# Patient Record
Sex: MALE | Race: WHITE
[De-identification: names, ages, dates, MRNs, and addresses within clinical notes are randomized per-mention and may not be internally consistent; named-entity substitution may affect disease eponyms.]

---

## 2017-12-20 ENCOUNTER — HOSPITAL ENCOUNTER (OUTPATIENT)
Dept: HOSPITAL 94 - RAD | Age: 57
Discharge: HOME | End: 2017-12-20
Attending: FAMILY MEDICINE
Payer: COMMERCIAL

## 2017-12-20 DIAGNOSIS — I12.9: Primary | ICD-10-CM

## 2017-12-20 DIAGNOSIS — N20.0: ICD-10-CM

## 2017-12-20 DIAGNOSIS — N18.1: ICD-10-CM

## 2017-12-20 DIAGNOSIS — I10: ICD-10-CM

## 2017-12-20 PROCEDURE — 76775 US EXAM ABDO BACK WALL LIM: CPT

## 2018-02-28 ENCOUNTER — HOSPITAL ENCOUNTER (OUTPATIENT)
Dept: HOSPITAL 94 - LAB | Age: 58
Discharge: HOME | End: 2018-02-28
Attending: FAMILY MEDICINE
Payer: COMMERCIAL

## 2018-02-28 DIAGNOSIS — E55.9: ICD-10-CM

## 2018-02-28 DIAGNOSIS — N18.1: ICD-10-CM

## 2018-02-28 DIAGNOSIS — I12.9: ICD-10-CM

## 2018-02-28 DIAGNOSIS — N20.0: ICD-10-CM

## 2018-02-28 DIAGNOSIS — Z91.89: ICD-10-CM

## 2018-02-28 DIAGNOSIS — E78.5: Primary | ICD-10-CM

## 2018-02-28 DIAGNOSIS — R73.02: ICD-10-CM

## 2018-02-28 LAB
ALBUMIN SERPL BCP-MCNC: 4 G/DL (ref 3.4–5)
ALBUMIN/GLOB SERPL: 1.3 {RATIO} (ref 1.1–1.5)
ALP SERPL-CCNC: 79 IU/L (ref 46–116)
ALT SERPL W P-5'-P-CCNC: 50 U/L (ref 12–78)
ANION GAP SERPL CALCULATED.3IONS-SCNC: 8 MMOL/L (ref 8–16)
AST SERPL W P-5'-P-CCNC: 27 U/L (ref 10–37)
BILIRUB SERPL-MCNC: 0.6 MG/DL (ref 0.1–1)
BUN SERPL-MCNC: 22 MG/DL (ref 7–18)
BUN/CREAT SERPL: 17.3 (ref 5.4–32)
CALCIUM SERPL-MCNC: 8.8 MG/DL (ref 8.5–10.1)
CHLORIDE SERPL-SCNC: 106 MMOL/L (ref 99–107)
CHOLEST SERPL-MCNC: 203 MG/DL (ref 0–200)
CHOLEST/HDLC SERPL: 4.6 {RATIO} (ref 0–4.99)
CO2 SERPL-SCNC: 28.4 MMOL/L (ref 24–32)
CREAT SERPL-MCNC: 1.27 MG/DL (ref 0.6–1.1)
GFR SERPL CREATININE-BSD FRML MDRD: 58 ML/MIN
GLUCOSE SERPL-MCNC: 97 MG/DL (ref 70–104)
HBA1C MFR BLD: 5.1 % (ref 4.5–6.2)
HDLC SERPL-MCNC: 44 MG/DL (ref 35–60)
LDLC SERPL DIRECT ASSAY-MCNC: 133 MG/DL (ref 50–100)
PH UR STRIP: 5 [PH] (ref 4.8–8)
POTASSIUM SERPL-SCNC: 4.4 MMOL/L (ref 3.5–5.1)
PROT SERPL-MCNC: 7.2 G/DL (ref 6.4–8.2)
SODIUM SERPL-SCNC: 142 MMOL/L (ref 135–145)
SP GR UR STRIP: 1.02 (ref 1–1.03)
TRIGL SERPL-MCNC: 119 MG/DL (ref 20–135)
URN COLLECT METHOD CLASS: (no result)
UROBILINOGEN UR STRIP-MCNC: 0.2 E.U/DL (ref 0.2–1)

## 2018-02-28 PROCEDURE — 83036 HEMOGLOBIN GLYCOSYLATED A1C: CPT

## 2018-02-28 PROCEDURE — 84403 ASSAY OF TOTAL TESTOSTERONE: CPT

## 2018-02-28 PROCEDURE — 84402 ASSAY OF FREE TESTOSTERONE: CPT

## 2018-02-28 PROCEDURE — 82306 VITAMIN D 25 HYDROXY: CPT

## 2018-02-28 PROCEDURE — 80061 LIPID PANEL: CPT

## 2018-02-28 PROCEDURE — 83970 ASSAY OF PARATHORMONE: CPT

## 2018-02-28 PROCEDURE — 36415 COLL VENOUS BLD VENIPUNCTURE: CPT

## 2018-02-28 PROCEDURE — 82330 ASSAY OF CALCIUM: CPT

## 2018-02-28 PROCEDURE — 81003 URINALYSIS AUTO W/O SCOPE: CPT

## 2018-02-28 PROCEDURE — 80053 COMPREHEN METABOLIC PANEL: CPT

## 2018-03-01 LAB
25(OH)D3 SERPL-MCNC: 21.8 NG/ML (ref 30–100)
PTH-INTACT SERPL-MCNC: 96.2 PG/ML (ref 11–67)

## 2018-04-11 ENCOUNTER — HOSPITAL ENCOUNTER (INPATIENT)
Dept: HOSPITAL 94 - PAS IN | Age: 58
LOS: 5 days | Discharge: HOME | DRG: 470 | End: 2018-04-16
Attending: ORTHOPAEDIC SURGERY | Admitting: ORTHOPAEDIC SURGERY
Payer: COMMERCIAL

## 2018-04-11 VITALS — HEIGHT: 72 IN | BODY MASS INDEX: 31.2 KG/M2 | WEIGHT: 230.38 LBS

## 2018-04-11 DIAGNOSIS — Z79.82: ICD-10-CM

## 2018-04-11 DIAGNOSIS — M16.12: Primary | ICD-10-CM

## 2018-04-11 DIAGNOSIS — D62: ICD-10-CM

## 2018-04-11 LAB
ALBUMIN SERPL BCP-MCNC: 4.2 G/DL (ref 3.4–5)
ALBUMIN/GLOB SERPL: 1.1 {RATIO} (ref 1.1–1.5)
ALP SERPL-CCNC: 100 IU/L (ref 46–116)
ALT SERPL W P-5'-P-CCNC: 48 U/L (ref 30–65)
ANION GAP SERPL CALCULATED.3IONS-SCNC: 9 MMOL/L (ref 8–16)
AST SERPL W P-5'-P-CCNC: 19 U/L (ref 10–37)
BACTERIA URNS QL MICRO: (no result) /HPF
BASOPHILS # BLD AUTO: 0 X10'3 (ref 0–0.2)
BASOPHILS NFR BLD AUTO: 0.7 % (ref 0–1)
BILIRUB SERPL-MCNC: 0.5 MG/DL (ref 0–1)
BUN SERPL-MCNC: 30 MG/DL (ref 7–18)
BUN/CREAT SERPL: 19.4 (ref 5.4–32)
CALCIUM SERPL-MCNC: 9.3 MG/DL (ref 8.5–10.1)
CHLORIDE SERPL-SCNC: 105 MMOL/L (ref 99–107)
CLARITY UR: CLEAR
CO2 SERPL-SCNC: 28.6 MMOL/L (ref 24–32)
COLOR UR: YELLOW
CREAT SERPL-MCNC: 1.55 MG/DL (ref 0.6–1.1)
DEPRECATED SQUAMOUS URNS QL MICRO: (no result) /LPF
EOSINOPHIL # BLD AUTO: 0.2 X10'3 (ref 0–0.9)
EOSINOPHIL NFR BLD AUTO: 3.5 % (ref 0–6)
ERYTHROCYTE [DISTWIDTH] IN BLOOD BY AUTOMATED COUNT: 13.5 % (ref 11.5–14.5)
GFR SERPL CREATININE-BSD FRML MDRD: 46 ML/MIN
GLUCOSE SERPL-MCNC: 83 MG/DL (ref 70–104)
GLUCOSE UR STRIP-MCNC: NEGATIVE MG/DL
HCT VFR BLD AUTO: 47 % (ref 42–52)
HGB BLD-MCNC: 15.8 G/DL (ref 14–17.9)
HGB UR QL STRIP: (no result)
HYALINE CASTS URNS QL MICRO: (no result) /LPF
KETONES UR STRIP-MCNC: NEGATIVE MG/DL
LEUKOCYTE ESTERASE UR QL STRIP: NEGATIVE
LYMPHOCYTES # BLD AUTO: 1.2 X10'3 (ref 1.1–4.8)
LYMPHOCYTES NFR BLD AUTO: 17.5 % (ref 21–51)
MCH RBC QN AUTO: 28.3 PG (ref 27–31)
MCHC RBC AUTO-ENTMCNC: 33.5 % (ref 33–36.5)
MCV RBC AUTO: 84.4 FL (ref 78–98)
MONOCYTES # BLD AUTO: 0.7 X10'3 (ref 0–0.9)
MONOCYTES NFR BLD AUTO: 9.9 % (ref 2–12)
MUCOUS THREADS URNS QL MICRO: (no result) /LPF
NEUTROPHILS # BLD AUTO: 5 X10'3 (ref 1.8–7.7)
NEUTROPHILS NFR BLD AUTO: 68.4 % (ref 42–75)
NITRITE UR QL STRIP: NEGATIVE
PH UR STRIP: 5 [PH] (ref 4.8–8)
PLATELET # BLD AUTO: 224 X10'3 (ref 140–440)
PMV BLD AUTO: 8.3 FL (ref 7.4–10.4)
POTASSIUM SERPL-SCNC: 4.2 MMOL/L (ref 3.4–5.1)
PROT SERPL-MCNC: 8 G/DL (ref 6.4–8.2)
PROT UR QL STRIP: NEGATIVE MG/DL
RBC # BLD AUTO: 5.57 X10'6 (ref 4.7–6.1)
RBC #/AREA URNS HPF: (no result) /HPF (ref 0–2)
SODIUM SERPL-SCNC: 143 MMOL/L (ref 135–145)
SP GR UR STRIP: 1.02 (ref 1–1.03)
URN COLLECT METHOD CLASS: (no result)
UROBILINOGEN UR STRIP-MCNC: 0.2 E.U/DL (ref 0.2–1)
WBC #/AREA URNS HPF: (no result) /HPF (ref 0–4)

## 2018-04-11 PROCEDURE — 87070 CULTURE OTHR SPECIMN AEROBIC: CPT

## 2018-04-11 PROCEDURE — 97162 PT EVAL MOD COMPLEX 30 MIN: CPT

## 2018-04-11 PROCEDURE — 81001 URINALYSIS AUTO W/SCOPE: CPT

## 2018-04-11 PROCEDURE — 72170 X-RAY EXAM OF PELVIS: CPT

## 2018-04-11 PROCEDURE — 86901 BLOOD TYPING SEROLOGIC RH(D): CPT

## 2018-04-11 PROCEDURE — 86885 COOMBS TEST INDIRECT QUAL: CPT

## 2018-04-11 PROCEDURE — 93005 ELECTROCARDIOGRAM TRACING: CPT

## 2018-04-11 PROCEDURE — 80053 COMPREHEN METABOLIC PANEL: CPT

## 2018-04-11 PROCEDURE — 71046 X-RAY EXAM CHEST 2 VIEWS: CPT

## 2018-04-11 PROCEDURE — 36415 COLL VENOUS BLD VENIPUNCTURE: CPT

## 2018-04-11 PROCEDURE — 97116 GAIT TRAINING THERAPY: CPT

## 2018-04-11 PROCEDURE — 85025 COMPLETE CBC W/AUTO DIFF WBC: CPT

## 2018-04-11 PROCEDURE — 97110 THERAPEUTIC EXERCISES: CPT

## 2018-04-11 PROCEDURE — 80051 ELECTROLYTE PANEL: CPT

## 2018-04-11 PROCEDURE — 97530 THERAPEUTIC ACTIVITIES: CPT

## 2018-04-11 PROCEDURE — 86900 BLOOD TYPING SEROLOGIC ABO: CPT

## 2018-04-12 VITALS — SYSTOLIC BLOOD PRESSURE: 105 MMHG | DIASTOLIC BLOOD PRESSURE: 70 MMHG

## 2018-04-12 VITALS — DIASTOLIC BLOOD PRESSURE: 80 MMHG | SYSTOLIC BLOOD PRESSURE: 116 MMHG

## 2018-04-12 VITALS — DIASTOLIC BLOOD PRESSURE: 93 MMHG | SYSTOLIC BLOOD PRESSURE: 139 MMHG

## 2018-04-12 VITALS — SYSTOLIC BLOOD PRESSURE: 91 MMHG | DIASTOLIC BLOOD PRESSURE: 57 MMHG

## 2018-04-12 VITALS — SYSTOLIC BLOOD PRESSURE: 110 MMHG | DIASTOLIC BLOOD PRESSURE: 72 MMHG

## 2018-04-12 VITALS — SYSTOLIC BLOOD PRESSURE: 102 MMHG | DIASTOLIC BLOOD PRESSURE: 63 MMHG

## 2018-04-12 VITALS — SYSTOLIC BLOOD PRESSURE: 90 MMHG | DIASTOLIC BLOOD PRESSURE: 55 MMHG

## 2018-04-12 VITALS — DIASTOLIC BLOOD PRESSURE: 69 MMHG | SYSTOLIC BLOOD PRESSURE: 105 MMHG

## 2018-04-12 VITALS — DIASTOLIC BLOOD PRESSURE: 53 MMHG | SYSTOLIC BLOOD PRESSURE: 88 MMHG

## 2018-04-12 VITALS — DIASTOLIC BLOOD PRESSURE: 84 MMHG | SYSTOLIC BLOOD PRESSURE: 137 MMHG

## 2018-04-12 VITALS — DIASTOLIC BLOOD PRESSURE: 72 MMHG | SYSTOLIC BLOOD PRESSURE: 145 MMHG

## 2018-04-12 VITALS — SYSTOLIC BLOOD PRESSURE: 117 MMHG | DIASTOLIC BLOOD PRESSURE: 73 MMHG

## 2018-04-12 VITALS — SYSTOLIC BLOOD PRESSURE: 86 MMHG | DIASTOLIC BLOOD PRESSURE: 49 MMHG

## 2018-04-12 VITALS — DIASTOLIC BLOOD PRESSURE: 53 MMHG | SYSTOLIC BLOOD PRESSURE: 93 MMHG

## 2018-04-12 VITALS — DIASTOLIC BLOOD PRESSURE: 46 MMHG | SYSTOLIC BLOOD PRESSURE: 107 MMHG

## 2018-04-12 VITALS — SYSTOLIC BLOOD PRESSURE: 111 MMHG | DIASTOLIC BLOOD PRESSURE: 69 MMHG

## 2018-04-12 VITALS — SYSTOLIC BLOOD PRESSURE: 104 MMHG | DIASTOLIC BLOOD PRESSURE: 60 MMHG

## 2018-04-12 VITALS — SYSTOLIC BLOOD PRESSURE: 133 MMHG | DIASTOLIC BLOOD PRESSURE: 81 MMHG

## 2018-04-12 VITALS — SYSTOLIC BLOOD PRESSURE: 143 MMHG | DIASTOLIC BLOOD PRESSURE: 76 MMHG

## 2018-04-12 VITALS — SYSTOLIC BLOOD PRESSURE: 117 MMHG | DIASTOLIC BLOOD PRESSURE: 68 MMHG

## 2018-04-12 PROCEDURE — 0SRB0JZ REPLACEMENT OF LEFT HIP JOINT WITH SYNTHETIC SUBSTITUTE, OPEN APPROACH: ICD-10-PCS | Performed by: ORTHOPAEDIC SURGERY

## 2018-04-12 RX ADMIN — CEFAZOLIN SODIUM SCH MLS/HR: 2 SOLUTION INTRAVENOUS at 23:29

## 2018-04-12 RX ADMIN — SODIUM CHLORIDE AND POTASSIUM CHLORIDE SCH MLS/HR: 4.5; 1.49 INJECTION, SOLUTION INTRAVENOUS at 15:13

## 2018-04-12 RX ADMIN — SODIUM CHLORIDE AND POTASSIUM CHLORIDE SCH MLS/HR: 4.5; 1.49 INJECTION, SOLUTION INTRAVENOUS at 23:13

## 2018-04-12 RX ADMIN — SODIUM CHLORIDE AND POTASSIUM CHLORIDE SCH MLS/HR: 4.5; 1.49 INJECTION, SOLUTION INTRAVENOUS at 20:25

## 2018-04-13 VITALS — DIASTOLIC BLOOD PRESSURE: 82 MMHG | SYSTOLIC BLOOD PRESSURE: 128 MMHG

## 2018-04-13 VITALS — SYSTOLIC BLOOD PRESSURE: 112 MMHG | DIASTOLIC BLOOD PRESSURE: 80 MMHG

## 2018-04-13 VITALS — DIASTOLIC BLOOD PRESSURE: 79 MMHG | SYSTOLIC BLOOD PRESSURE: 138 MMHG

## 2018-04-13 VITALS — SYSTOLIC BLOOD PRESSURE: 130 MMHG | DIASTOLIC BLOOD PRESSURE: 75 MMHG

## 2018-04-13 VITALS — DIASTOLIC BLOOD PRESSURE: 88 MMHG | SYSTOLIC BLOOD PRESSURE: 153 MMHG

## 2018-04-13 LAB
ANION GAP SERPL CALCULATED.3IONS-SCNC: 7 MMOL/L (ref 8–16)
BASOPHILS # BLD AUTO: 0 X10'3 (ref 0–0.2)
BASOPHILS NFR BLD AUTO: 0.2 % (ref 0–1)
CHLORIDE SERPL-SCNC: 106 MMOL/L (ref 99–107)
CO2 SERPL-SCNC: 27.7 MMOL/L (ref 24–32)
EOSINOPHIL # BLD AUTO: 0.1 X10'3 (ref 0–0.9)
EOSINOPHIL NFR BLD AUTO: 1.3 % (ref 0–6)
ERYTHROCYTE [DISTWIDTH] IN BLOOD BY AUTOMATED COUNT: 13.8 % (ref 11.5–14.5)
HCT VFR BLD AUTO: 37 % (ref 42–52)
HGB BLD-MCNC: 12.9 G/DL (ref 14–17.9)
LYMPHOCYTES # BLD AUTO: 0.5 X10'3 (ref 1.1–4.8)
LYMPHOCYTES NFR BLD AUTO: 6.9 % (ref 21–51)
MCH RBC QN AUTO: 29.2 PG (ref 27–31)
MCHC RBC AUTO-ENTMCNC: 34.7 % (ref 33–36.5)
MCV RBC AUTO: 84.2 FL (ref 78–98)
MONOCYTES # BLD AUTO: 0.6 X10'3 (ref 0–0.9)
MONOCYTES NFR BLD AUTO: 8 % (ref 2–12)
NEUTROPHILS # BLD AUTO: 6.4 X10'3 (ref 1.8–7.7)
NEUTROPHILS NFR BLD AUTO: 83.6 % (ref 42–75)
PLATELET # BLD AUTO: 188 X10'3 (ref 140–440)
PMV BLD AUTO: 7.9 FL (ref 7.4–10.4)
POTASSIUM SERPL-SCNC: 4.3 MMOL/L (ref 3.5–5.1)
RBC # BLD AUTO: 4.4 X10'6 (ref 4.7–6.1)
SODIUM SERPL-SCNC: 141 MMOL/L (ref 135–145)
WBC # BLD AUTO: 7.6 X10'3 (ref 4.5–11)

## 2018-04-13 RX ADMIN — ONDANSETRON PRN MG: 2 INJECTION, SOLUTION INTRAMUSCULAR; INTRAVENOUS at 17:48

## 2018-04-13 RX ADMIN — SODIUM CHLORIDE AND POTASSIUM CHLORIDE SCH MLS/HR: 4.5; 1.49 INJECTION, SOLUTION INTRAVENOUS at 23:13

## 2018-04-13 RX ADMIN — SODIUM CHLORIDE AND POTASSIUM CHLORIDE SCH MLS/HR: 4.5; 1.49 INJECTION, SOLUTION INTRAVENOUS at 15:55

## 2018-04-13 RX ADMIN — ONDANSETRON PRN MG: 2 INJECTION, SOLUTION INTRAMUSCULAR; INTRAVENOUS at 07:20

## 2018-04-13 RX ADMIN — ONDANSETRON PRN MG: 2 INJECTION, SOLUTION INTRAMUSCULAR; INTRAVENOUS at 00:19

## 2018-04-13 RX ADMIN — SODIUM CHLORIDE AND POTASSIUM CHLORIDE SCH MLS/HR: 4.5; 1.49 INJECTION, SOLUTION INTRAVENOUS at 05:42

## 2018-04-13 RX ADMIN — CEFAZOLIN SODIUM SCH MLS/HR: 2 SOLUTION INTRAVENOUS at 08:41

## 2018-04-13 RX ADMIN — THERA TABS SCH EACH: TAB at 08:40

## 2018-04-14 VITALS — SYSTOLIC BLOOD PRESSURE: 139 MMHG | DIASTOLIC BLOOD PRESSURE: 80 MMHG

## 2018-04-14 VITALS — SYSTOLIC BLOOD PRESSURE: 144 MMHG | DIASTOLIC BLOOD PRESSURE: 92 MMHG

## 2018-04-14 VITALS — DIASTOLIC BLOOD PRESSURE: 75 MMHG | SYSTOLIC BLOOD PRESSURE: 124 MMHG

## 2018-04-14 VITALS — SYSTOLIC BLOOD PRESSURE: 143 MMHG | DIASTOLIC BLOOD PRESSURE: 89 MMHG

## 2018-04-14 LAB
BASOPHILS # BLD AUTO: 0 X10'3 (ref 0–0.2)
BASOPHILS NFR BLD AUTO: 0.1 % (ref 0–1)
EOSINOPHIL # BLD AUTO: 0.3 X10'3 (ref 0–0.9)
EOSINOPHIL NFR BLD AUTO: 3.5 % (ref 0–6)
ERYTHROCYTE [DISTWIDTH] IN BLOOD BY AUTOMATED COUNT: 13.5 % (ref 11.5–14.5)
HCT VFR BLD AUTO: 38.9 % (ref 42–52)
HGB BLD-MCNC: 13.3 G/DL (ref 14–17.9)
LYMPHOCYTES # BLD AUTO: 0.8 X10'3 (ref 1.1–4.8)
LYMPHOCYTES NFR BLD AUTO: 8.5 % (ref 21–51)
MCH RBC QN AUTO: 29.3 PG (ref 27–31)
MCHC RBC AUTO-ENTMCNC: 34.2 % (ref 33–36.5)
MCV RBC AUTO: 85.7 FL (ref 78–98)
MONOCYTES # BLD AUTO: 1.1 X10'3 (ref 0–0.9)
MONOCYTES NFR BLD AUTO: 11.7 % (ref 2–12)
NEUTROPHILS # BLD AUTO: 7 X10'3 (ref 1.8–7.7)
NEUTROPHILS NFR BLD AUTO: 76.2 % (ref 42–75)
PLATELET # BLD AUTO: 174 X10'3 (ref 140–440)
PMV BLD AUTO: 7.7 FL (ref 7.4–10.4)
RBC # BLD AUTO: 4.54 X10'6 (ref 4.7–6.1)
WBC # BLD AUTO: 9.2 X10'3 (ref 4.5–11)

## 2018-04-14 RX ADMIN — THERA TABS SCH EACH: TAB at 08:34

## 2018-04-15 VITALS — SYSTOLIC BLOOD PRESSURE: 138 MMHG | DIASTOLIC BLOOD PRESSURE: 88 MMHG

## 2018-04-15 VITALS — SYSTOLIC BLOOD PRESSURE: 150 MMHG | DIASTOLIC BLOOD PRESSURE: 85 MMHG

## 2018-04-15 VITALS — DIASTOLIC BLOOD PRESSURE: 86 MMHG | SYSTOLIC BLOOD PRESSURE: 153 MMHG

## 2018-04-15 VITALS — SYSTOLIC BLOOD PRESSURE: 150 MMHG | DIASTOLIC BLOOD PRESSURE: 93 MMHG

## 2018-04-15 LAB
BASOPHILS # BLD AUTO: 0 X10'3 (ref 0–0.2)
BASOPHILS NFR BLD AUTO: 0.2 % (ref 0–1)
EOSINOPHIL # BLD AUTO: 0.3 X10'3 (ref 0–0.9)
EOSINOPHIL NFR BLD AUTO: 4 % (ref 0–6)
ERYTHROCYTE [DISTWIDTH] IN BLOOD BY AUTOMATED COUNT: 13.9 % (ref 11.5–14.5)
HCT VFR BLD AUTO: 37.5 % (ref 42–52)
HGB BLD-MCNC: 13 G/DL (ref 14–17.9)
LYMPHOCYTES # BLD AUTO: 0.7 X10'3 (ref 1.1–4.8)
LYMPHOCYTES NFR BLD AUTO: 9.5 % (ref 21–51)
MCH RBC QN AUTO: 29.4 PG (ref 27–31)
MCHC RBC AUTO-ENTMCNC: 34.6 % (ref 33–36.5)
MCV RBC AUTO: 85.1 FL (ref 78–98)
MONOCYTES # BLD AUTO: 1 X10'3 (ref 0–0.9)
MONOCYTES NFR BLD AUTO: 12.1 % (ref 2–12)
NEUTROPHILS # BLD AUTO: 5.8 X10'3 (ref 1.8–7.7)
NEUTROPHILS NFR BLD AUTO: 74.2 % (ref 42–75)
PLATELET # BLD AUTO: 195 X10'3 (ref 140–440)
PMV BLD AUTO: 7.7 FL (ref 7.4–10.4)
RBC # BLD AUTO: 4.41 X10'6 (ref 4.7–6.1)
WBC # BLD AUTO: 7.9 X10'3 (ref 4.5–11)

## 2018-04-15 RX ADMIN — THERA TABS SCH EACH: TAB at 07:07

## 2018-04-16 VITALS — SYSTOLIC BLOOD PRESSURE: 138 MMHG | DIASTOLIC BLOOD PRESSURE: 90 MMHG

## 2018-04-16 VITALS — DIASTOLIC BLOOD PRESSURE: 92 MMHG | SYSTOLIC BLOOD PRESSURE: 152 MMHG

## 2018-04-16 RX ADMIN — THERA TABS SCH EACH: TAB at 07:54

## 2019-04-18 ENCOUNTER — HOSPITAL ENCOUNTER (EMERGENCY)
Dept: HOSPITAL 94 - ER | Age: 59
Discharge: HOME | End: 2019-04-18
Payer: COMMERCIAL

## 2019-04-18 VITALS — WEIGHT: 224.43 LBS | HEIGHT: 72 IN | BODY MASS INDEX: 30.4 KG/M2

## 2019-04-18 VITALS — DIASTOLIC BLOOD PRESSURE: 87 MMHG | SYSTOLIC BLOOD PRESSURE: 147 MMHG

## 2019-04-18 DIAGNOSIS — Z98.890: ICD-10-CM

## 2019-04-18 DIAGNOSIS — Z90.49: ICD-10-CM

## 2019-04-18 DIAGNOSIS — Z79.2: ICD-10-CM

## 2019-04-18 DIAGNOSIS — I10: ICD-10-CM

## 2019-04-18 DIAGNOSIS — Z79.899: ICD-10-CM

## 2019-04-18 DIAGNOSIS — Z79.82: ICD-10-CM

## 2019-04-18 DIAGNOSIS — J20.9: Primary | ICD-10-CM

## 2019-04-18 PROCEDURE — 99283 EMERGENCY DEPT VISIT LOW MDM: CPT

## 2019-04-18 PROCEDURE — 93005 ELECTROCARDIOGRAM TRACING: CPT

## 2019-04-18 PROCEDURE — 71045 X-RAY EXAM CHEST 1 VIEW: CPT

## 2019-06-18 ENCOUNTER — HOSPITAL ENCOUNTER (EMERGENCY)
Dept: HOSPITAL 94 - ER | Age: 59
Discharge: HOME | End: 2019-06-18
Payer: COMMERCIAL

## 2019-06-18 VITALS — DIASTOLIC BLOOD PRESSURE: 100 MMHG | SYSTOLIC BLOOD PRESSURE: 150 MMHG

## 2019-06-18 VITALS — HEIGHT: 72 IN | WEIGHT: 225.47 LBS | BODY MASS INDEX: 30.54 KG/M2

## 2019-06-18 DIAGNOSIS — Z77.21: ICD-10-CM

## 2019-06-18 DIAGNOSIS — Z90.49: ICD-10-CM

## 2019-06-18 DIAGNOSIS — Z98.890: ICD-10-CM

## 2019-06-18 DIAGNOSIS — I10: Primary | ICD-10-CM

## 2019-06-18 DIAGNOSIS — Z87.442: ICD-10-CM

## 2019-06-18 DIAGNOSIS — Z79.82: ICD-10-CM

## 2019-06-18 PROCEDURE — 99281 EMR DPT VST MAYX REQ PHY/QHP: CPT

## 2019-08-01 ENCOUNTER — HOSPITAL ENCOUNTER (EMERGENCY)
Dept: HOSPITAL 94 - ER | Age: 59
Discharge: HOME | End: 2019-08-01
Payer: COMMERCIAL

## 2019-08-01 VITALS — BODY MASS INDEX: 30.68 KG/M2 | WEIGHT: 231.49 LBS | HEIGHT: 73 IN

## 2019-08-01 VITALS — DIASTOLIC BLOOD PRESSURE: 65 MMHG | SYSTOLIC BLOOD PRESSURE: 132 MMHG
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
## 2019-08-01 DIAGNOSIS — S46.811A: Primary | ICD-10-CM

## 2019-08-01 DIAGNOSIS — Z90.49: ICD-10-CM
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
## 2019-08-01 DIAGNOSIS — Z87.442: ICD-10-CM

## 2019-08-01 DIAGNOSIS — Z79.82: ICD-10-CM

## 2019-08-01 DIAGNOSIS — I10: ICD-10-CM
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
## 2019-08-01 PROCEDURE — 99281 EMR DPT VST MAYX REQ PHY/QHP: CPT

## 2019-12-02 ENCOUNTER — HOSPITAL ENCOUNTER (EMERGENCY)
Dept: HOSPITAL 94 - ER | Age: 59
Discharge: HOME | End: 2019-12-02
Payer: COMMERCIAL

## 2019-12-02 VITALS — HEIGHT: 72 IN | BODY MASS INDEX: 30.54 KG/M2 | WEIGHT: 225.47 LBS

## 2019-12-02 VITALS — SYSTOLIC BLOOD PRESSURE: 131 MMHG | DIASTOLIC BLOOD PRESSURE: 80 MMHG

## 2019-12-02 DIAGNOSIS — Z90.49: ICD-10-CM

## 2019-12-02 DIAGNOSIS — I10: ICD-10-CM
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
## 2019-12-02 DIAGNOSIS — Z87.442: ICD-10-CM
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
## 2019-12-02 DIAGNOSIS — Z98.890: ICD-10-CM

## 2019-12-02 DIAGNOSIS — Z79.82: ICD-10-CM

## 2019-12-02 DIAGNOSIS — T15.02XA: Primary | ICD-10-CM
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
## 2019-12-02 PROCEDURE — 99284 EMERGENCY DEPT VISIT MOD MDM: CPT

## 2019-12-02 PROCEDURE — 65220 REMOVE FOREIGN BODY FROM EYE: CPT

## 2020-03-24 ENCOUNTER — HOSPITAL ENCOUNTER (OUTPATIENT)
Dept: HOSPITAL 94 - LAB | Age: 60
Discharge: HOME | End: 2020-03-24
Attending: FAMILY MEDICINE
Payer: COMMERCIAL

## 2020-03-24 DIAGNOSIS — Z91.09: ICD-10-CM

## 2020-03-24 DIAGNOSIS — M16.10: Primary | ICD-10-CM

## 2020-03-24 DIAGNOSIS — I10: ICD-10-CM

## 2020-03-24 DIAGNOSIS — E78.5: ICD-10-CM

## 2020-03-24 DIAGNOSIS — Z87.442: ICD-10-CM

## 2020-03-24 LAB
ALBUMIN SERPL BCP-MCNC: 3.8 G/DL (ref 3.4–5)
ALBUMIN/GLOB SERPL: 1.1 {RATIO} (ref 1.1–1.5)
ALP SERPL-CCNC: 76 IU/L (ref 46–116)
ALT SERPL W P-5'-P-CCNC: 43 U/L (ref 12–78)
ANION GAP SERPL CALCULATED.3IONS-SCNC: 10 MMOL/L (ref 8–16)
AST SERPL W P-5'-P-CCNC: 27 U/L (ref 10–37)
BASOPHILS # BLD AUTO: 0.1 X10'3 (ref 0–0.2)
BASOPHILS NFR BLD AUTO: 0.9 % (ref 0–1)
BILIRUB SERPL-MCNC: 0.4 MG/DL (ref 0.1–1)
BUN SERPL-MCNC: 25 MG/DL (ref 7–18)
BUN/CREAT SERPL: 16.9 (ref 5.4–32)
CALCIUM SERPL-MCNC: 8.5 MG/DL (ref 8.5–10.1)
CHLORIDE SERPL-SCNC: 105 MMOL/L (ref 99–107)
CHOLEST SERPL-MCNC: 202 MG/DL (ref 0–200)
CHOLEST/HDLC SERPL: 4.9 {RATIO} (ref 0–4.99)
CO2 SERPL-SCNC: 27 MMOL/L (ref 24–32)
CREAT SERPL-MCNC: 1.48 MG/DL (ref 0.6–1.1)
CRP SERPL HS-MCNC: 0.27 MG/DL (ref 0–0.5)
EOSINOPHIL # BLD AUTO: 0.3 X10'3 (ref 0–0.9)
EOSINOPHIL NFR BLD AUTO: 6.1 % (ref 0–6)
ERYTHROCYTE [DISTWIDTH] IN BLOOD BY AUTOMATED COUNT: 13.5 % (ref 11.5–14.5)
GFR SERPL CREATININE-BSD FRML MDRD: 48 ML/MIN
GLUCOSE SERPL-MCNC: 92 MG/DL (ref 70–104)
HCT VFR BLD AUTO: 46.8 % (ref 42–52)
HDLC SERPL-MCNC: 41 MG/DL (ref 35–60)
HGB BLD-MCNC: 15.9 G/DL (ref 14–17.9)
LDLC SERPL DIRECT ASSAY-MCNC: 129 MG/DL (ref 50–100)
LYMPHOCYTES # BLD AUTO: 1.2 X10'3 (ref 1.1–4.8)
LYMPHOCYTES NFR BLD AUTO: 21 % (ref 21–51)
MCH RBC QN AUTO: 28.9 PG (ref 27–31)
MCHC RBC AUTO-ENTMCNC: 34 G/DL (ref 33–36.5)
MCV RBC AUTO: 85.1 FL (ref 78–98)
MONOCYTES # BLD AUTO: 0.6 X10'3 (ref 0–0.9)
MONOCYTES NFR BLD AUTO: 10.5 % (ref 2–12)
NEUTROPHILS # BLD AUTO: 3.5 X10'3 (ref 1.8–7.7)
NEUTROPHILS NFR BLD AUTO: 61.5 % (ref 42–75)
PLATELET # BLD AUTO: 233 X10'3 (ref 140–440)
PMV BLD AUTO: 7.8 FL (ref 7.4–10.4)
POTASSIUM SERPL-SCNC: 4 MMOL/L (ref 3.5–5.1)
PROT SERPL-MCNC: 7.3 G/DL (ref 6.4–8.2)
RBC # BLD AUTO: 5.5 X10'6 (ref 4.7–6.1)
SODIUM SERPL-SCNC: 142 MMOL/L (ref 135–145)
TRIGL SERPL-MCNC: 147 MG/DL (ref 20–135)
WBC # BLD AUTO: 5.7 X10'3 (ref 4.5–11)

## 2020-03-24 PROCEDURE — 80061 LIPID PANEL: CPT

## 2020-03-24 PROCEDURE — 82330 ASSAY OF CALCIUM: CPT

## 2020-03-24 PROCEDURE — 82570 ASSAY OF URINE CREATININE: CPT

## 2020-03-24 PROCEDURE — 85025 COMPLETE CBC W/AUTO DIFF WBC: CPT

## 2020-03-24 PROCEDURE — 86140 C-REACTIVE PROTEIN: CPT

## 2020-03-24 PROCEDURE — 80053 COMPREHEN METABOLIC PANEL: CPT

## 2020-03-24 PROCEDURE — 36415 COLL VENOUS BLD VENIPUNCTURE: CPT

## 2020-03-24 PROCEDURE — 85651 RBC SED RATE NONAUTOMATED: CPT

## 2020-03-24 PROCEDURE — 82043 UR ALBUMIN QUANTITATIVE: CPT

## 2020-03-25 LAB — MICROALBUMIN/CREAT UR: 4 MG/G CREAT (ref 0–29)

## 2020-06-02 ENCOUNTER — HOSPITAL ENCOUNTER (EMERGENCY)
Dept: HOSPITAL 94 - ER | Age: 60
Discharge: HOME | End: 2020-06-02
Payer: COMMERCIAL

## 2020-06-02 VITALS — WEIGHT: 242.51 LBS | HEIGHT: 74 IN | BODY MASS INDEX: 31.12 KG/M2

## 2020-06-02 VITALS — SYSTOLIC BLOOD PRESSURE: 139 MMHG | DIASTOLIC BLOOD PRESSURE: 91 MMHG

## 2020-06-02 DIAGNOSIS — U07.1: Primary | ICD-10-CM

## 2020-06-02 DIAGNOSIS — Z79.899: ICD-10-CM

## 2020-06-02 DIAGNOSIS — Z87.442: ICD-10-CM

## 2020-06-02 DIAGNOSIS — Z98.890: ICD-10-CM

## 2020-06-02 DIAGNOSIS — I10: ICD-10-CM

## 2020-06-02 DIAGNOSIS — Z90.49: ICD-10-CM

## 2020-06-02 DIAGNOSIS — Z79.82: ICD-10-CM

## 2020-06-02 LAB
ALBUMIN SERPL BCP-MCNC: 3.8 G/DL (ref 3.4–5)
ALBUMIN/GLOB SERPL: 1.2 {RATIO} (ref 1.1–1.5)
ALP SERPL-CCNC: 73 IU/L (ref 46–116)
ALT SERPL W P-5'-P-CCNC: 39 U/L (ref 12–78)
ANION GAP SERPL CALCULATED.3IONS-SCNC: 7 MMOL/L (ref 8–16)
AST SERPL W P-5'-P-CCNC: 21 U/L (ref 10–37)
BASOPHILS # BLD AUTO: 0 X10'3 (ref 0–0.2)
BASOPHILS NFR BLD AUTO: 0.5 % (ref 0–1)
BILIRUB SERPL-MCNC: 0.4 MG/DL (ref 0.1–1)
BUN SERPL-MCNC: 18 MG/DL (ref 7–18)
BUN/CREAT SERPL: 10.3 (ref 5.4–32)
CALCIUM SERPL-MCNC: 8.7 MG/DL (ref 8.5–10.1)
CHLORIDE SERPL-SCNC: 106 MMOL/L (ref 99–107)
CO2 SERPL-SCNC: 27.8 MMOL/L (ref 24–32)
CREAT SERPL-MCNC: 1.75 MG/DL (ref 0.6–1.1)
EOSINOPHIL # BLD AUTO: 0.1 X10'3 (ref 0–0.9)
EOSINOPHIL NFR BLD AUTO: 1.2 % (ref 0–6)
EOSINOPHIL NFR BLD MANUAL: 1 % (ref 0–6)
ERYTHROCYTE [DISTWIDTH] IN BLOOD BY AUTOMATED COUNT: 14.1 % (ref 11.5–14.5)
GFR SERPL CREATININE-BSD FRML MDRD: 40 ML/MIN
GLUCOSE SERPL-MCNC: 100 MG/DL (ref 70–104)
HCT VFR BLD AUTO: 45.2 % (ref 42–52)
HGB BLD-MCNC: 15.2 G/DL (ref 14–17.9)
LDH SERPL-CCNC: 203 U/L (ref 85–227)
LYMPHOCYTES # BLD AUTO: 0.5 X10'3 (ref 1.1–4.8)
LYMPHOCYTES NFR BLD AUTO: 10.1 % (ref 21–51)
LYMPHOCYTES NFR BLD MANUAL: 11 % (ref 21–51)
MCH RBC QN AUTO: 29.1 PG (ref 27–31)
MCHC RBC AUTO-ENTMCNC: 33.6 G/DL (ref 33–36.5)
MCV RBC AUTO: 86.5 FL (ref 78–98)
MONOCYTES # BLD AUTO: 1 X10'3 (ref 0–0.9)
MONOCYTES NFR BLD AUTO: 19.7 % (ref 2–12)
MONOCYTES NFR BLD MANUAL: 17 % (ref 2–12)
NEUTROPHILS # BLD AUTO: 3.5 X10'3 (ref 1.8–7.7)
NEUTROPHILS NFR BLD AUTO: 68.5 % (ref 42–75)
NEUTS BAND # BLD MANUAL: 1 % (ref 0–10)
NEUTS SEG NFR BLD MANUAL: 70 % (ref 42–75)
NEUTS VAC BLD QL SMEAR: (no result)
PLATELET # BLD AUTO: 181 X10'3 (ref 140–440)
PLATELET BLD QL SMEAR: NORMAL
PMV BLD AUTO: 7.8 FL (ref 7.4–10.4)
POTASSIUM SERPL-SCNC: 4 MMOL/L (ref 3.5–5.1)
PROT SERPL-MCNC: 6.9 G/DL (ref 6.4–8.2)
RBC # BLD AUTO: 5.22 X10'6 (ref 4.7–6.1)
RBC MORPH BLD: NORMAL
SMUDGE CELLS BLD QL SMEAR: (no result)
SODIUM SERPL-SCNC: 141 MMOL/L (ref 135–145)
TOTAL CELLS COUNTED FLD: 100
WBC # BLD AUTO: 5.2 X10'3 (ref 4.5–11)

## 2020-06-02 PROCEDURE — 85025 COMPLETE CBC W/AUTO DIFF WBC: CPT

## 2020-06-02 PROCEDURE — 36415 COLL VENOUS BLD VENIPUNCTURE: CPT

## 2020-06-02 PROCEDURE — 93005 ELECTROCARDIOGRAM TRACING: CPT

## 2020-06-02 PROCEDURE — 71045 X-RAY EXAM CHEST 1 VIEW: CPT

## 2020-06-02 PROCEDURE — 99285 EMERGENCY DEPT VISIT HI MDM: CPT

## 2020-06-02 PROCEDURE — 83615 LACTATE (LD) (LDH) ENZYME: CPT

## 2020-06-02 PROCEDURE — 96374 THER/PROPH/DIAG INJ IV PUSH: CPT

## 2020-06-02 PROCEDURE — 80053 COMPREHEN METABOLIC PANEL: CPT

## 2020-06-02 PROCEDURE — 87635 SARS-COV-2 COVID-19 AMP PRB: CPT

## 2020-06-06 ENCOUNTER — HOSPITAL ENCOUNTER (EMERGENCY)
Dept: HOSPITAL 94 - ER | Age: 60
Discharge: HOME | End: 2020-06-06
Payer: COMMERCIAL

## 2020-06-06 VITALS — BODY MASS INDEX: 30.95 KG/M2 | WEIGHT: 228.49 LBS | HEIGHT: 72 IN

## 2020-06-06 VITALS — DIASTOLIC BLOOD PRESSURE: 79 MMHG | SYSTOLIC BLOOD PRESSURE: 115 MMHG

## 2020-06-06 DIAGNOSIS — R06.02: ICD-10-CM

## 2020-06-06 DIAGNOSIS — I10: ICD-10-CM

## 2020-06-06 DIAGNOSIS — Z90.49: ICD-10-CM

## 2020-06-06 DIAGNOSIS — Z79.899: ICD-10-CM

## 2020-06-06 DIAGNOSIS — R05: ICD-10-CM

## 2020-06-06 DIAGNOSIS — Z79.82: ICD-10-CM

## 2020-06-06 DIAGNOSIS — U07.1: Primary | ICD-10-CM

## 2020-06-06 DIAGNOSIS — Z98.890: ICD-10-CM

## 2020-06-06 PROCEDURE — 99283 EMERGENCY DEPT VISIT LOW MDM: CPT

## 2020-06-06 PROCEDURE — 71045 X-RAY EXAM CHEST 1 VIEW: CPT

## 2020-06-10 ENCOUNTER — HOSPITAL ENCOUNTER (INPATIENT)
Dept: HOSPITAL 94 - ER | Age: 60
LOS: 3 days | Discharge: HOME | DRG: 177 | End: 2020-06-13
Attending: INTERNAL MEDICINE | Admitting: FAMILY MEDICINE
Payer: COMMERCIAL

## 2020-06-10 VITALS — SYSTOLIC BLOOD PRESSURE: 109 MMHG | DIASTOLIC BLOOD PRESSURE: 68 MMHG

## 2020-06-10 VITALS — HEIGHT: 72 IN | WEIGHT: 226.48 LBS | BODY MASS INDEX: 30.68 KG/M2

## 2020-06-10 VITALS — SYSTOLIC BLOOD PRESSURE: 120 MMHG | DIASTOLIC BLOOD PRESSURE: 70 MMHG

## 2020-06-10 DIAGNOSIS — G89.29: ICD-10-CM

## 2020-06-10 DIAGNOSIS — E66.9: ICD-10-CM

## 2020-06-10 DIAGNOSIS — R19.7: ICD-10-CM

## 2020-06-10 DIAGNOSIS — I12.9: ICD-10-CM

## 2020-06-10 DIAGNOSIS — Z96.642: ICD-10-CM

## 2020-06-10 DIAGNOSIS — N17.9: ICD-10-CM

## 2020-06-10 DIAGNOSIS — U07.1: Primary | ICD-10-CM

## 2020-06-10 DIAGNOSIS — Z87.442: ICD-10-CM

## 2020-06-10 DIAGNOSIS — Z90.49: ICD-10-CM

## 2020-06-10 DIAGNOSIS — J12.89: ICD-10-CM

## 2020-06-10 DIAGNOSIS — Z23: ICD-10-CM

## 2020-06-10 DIAGNOSIS — N18.3: ICD-10-CM

## 2020-06-10 DIAGNOSIS — E86.0: ICD-10-CM

## 2020-06-10 DIAGNOSIS — R09.02: ICD-10-CM

## 2020-06-10 DIAGNOSIS — M54.9: ICD-10-CM

## 2020-06-10 LAB
ALBUMIN SERPL BCP-MCNC: 3 G/DL (ref 3.4–5)
ALBUMIN/GLOB SERPL: 0.9 {RATIO} (ref 1.1–1.5)
ALP SERPL-CCNC: 73 IU/L (ref 46–116)
ALT SERPL W P-5'-P-CCNC: 52 U/L (ref 12–78)
ANION GAP SERPL CALCULATED.3IONS-SCNC: 7 MMOL/L (ref 8–16)
APTT PPP: 33 SECONDS (ref 22–32)
AST SERPL W P-5'-P-CCNC: 36 U/L (ref 10–37)
BACTERIA URNS QL MICRO: (no result) /HPF
BASOPHILS # BLD AUTO: 0 X10'3 (ref 0–0.2)
BASOPHILS NFR BLD AUTO: 0.1 % (ref 0–1)
BILIRUB SERPL-MCNC: 0.5 MG/DL (ref 0.1–1)
BUN SERPL-MCNC: 19 MG/DL (ref 7–18)
BUN/CREAT SERPL: 10.7 (ref 5.4–32)
CALCIUM SERPL-MCNC: 8 MG/DL (ref 8.5–10.1)
CHLORIDE SERPL-SCNC: 106 MMOL/L (ref 99–107)
CLARITY UR: CLEAR
CO2 SERPL-SCNC: 28.6 MMOL/L (ref 24–32)
COLOR UR: YELLOW
CREAT SERPL-MCNC: 1.78 MG/DL (ref 0.6–1.1)
CRP SERPL HS-MCNC: 7.77 MG/DL (ref 0–0.5)
D DIMER PPP FEU-MCNC: 0.37 MG/L FEU (ref 0–0.5)
DEPRECATED SQUAMOUS URNS QL MICRO: (no result) /LPF
EOSINOPHIL # BLD AUTO: 0 X10'3 (ref 0–0.9)
EOSINOPHIL NFR BLD AUTO: 0.4 % (ref 0–6)
ERYTHROCYTE [DISTWIDTH] IN BLOOD BY AUTOMATED COUNT: 14.4 % (ref 11.5–14.5)
GFR SERPL CREATININE-BSD FRML MDRD: 39 ML/MIN
GLUCOSE SERPL-MCNC: 81 MG/DL (ref 70–104)
GLUCOSE UR STRIP-MCNC: NEGATIVE MG/DL
HCT VFR BLD AUTO: 48.4 % (ref 42–52)
HGB BLD-MCNC: 16.1 G/DL (ref 14–17.9)
HGB UR QL STRIP: (no result)
HYALINE CASTS URNS QL MICRO: (no result) /LPF
KETONES UR STRIP-MCNC: 15 MG/DL
LDH SERPL-CCNC: 265 U/L (ref 85–227)
LEUKOCYTE ESTERASE UR QL STRIP: NEGATIVE
LYMPHOCYTES # BLD AUTO: 0.5 X10'3 (ref 1.1–4.8)
LYMPHOCYTES NFR BLD AUTO: 14.9 % (ref 21–51)
MCH RBC QN AUTO: 28.7 PG (ref 27–31)
MCHC RBC AUTO-ENTMCNC: 33.3 G/DL (ref 33–36.5)
MCV RBC AUTO: 86.2 FL (ref 78–98)
MONOCYTES # BLD AUTO: 0.4 X10'3 (ref 0–0.9)
MONOCYTES NFR BLD AUTO: 10.5 % (ref 2–12)
MUCOUS THREADS URNS QL MICRO: (no result) /LPF
NEUTROPHILS # BLD AUTO: 2.7 X10'3 (ref 1.8–7.7)
NEUTROPHILS NFR BLD AUTO: 74.1 % (ref 42–75)
NITRITE UR QL STRIP: NEGATIVE
PH UR STRIP: 5.5 [PH] (ref 4.8–8)
PLATELET # BLD AUTO: 159 X10'3 (ref 140–440)
PMV BLD AUTO: 8.6 FL (ref 7.4–10.4)
POTASSIUM SERPL-SCNC: 3.9 MMOL/L (ref 3.5–5.1)
PROT SERPL-MCNC: 6.5 G/DL (ref 6.4–8.2)
PROT UR QL STRIP: (no result) MG/DL
RBC # BLD AUTO: 5.61 X10'6 (ref 4.7–6.1)
RBC #/AREA URNS HPF: (no result) /HPF (ref 0–2)
SODIUM SERPL-SCNC: 142 MMOL/L (ref 135–145)
SP GR UR STRIP: >=1.03 (ref 1–1.03)
TRANS CELLS URNS QL MICRO: (no result) /HPF
TROPONIN I SERPL-MCNC: < 0.04 NG/ML (ref 0–0.05)
URN COLLECT METHOD CLASS: (no result)
UROBILINOGEN UR STRIP-MCNC: 0.2 E.U/DL (ref 0.2–1)
WBC # BLD AUTO: 3.6 X10'3 (ref 4.5–11)
WBC #/AREA URNS HPF: (no result) /HPF (ref 0–4)

## 2020-06-10 PROCEDURE — 83735 ASSAY OF MAGNESIUM: CPT

## 2020-06-10 PROCEDURE — 80053 COMPREHEN METABOLIC PANEL: CPT

## 2020-06-10 PROCEDURE — 83605 ASSAY OF LACTIC ACID: CPT

## 2020-06-10 PROCEDURE — 85730 THROMBOPLASTIN TIME PARTIAL: CPT

## 2020-06-10 PROCEDURE — 99285 EMERGENCY DEPT VISIT HI MDM: CPT

## 2020-06-10 PROCEDURE — 85379 FIBRIN DEGRADATION QUANT: CPT

## 2020-06-10 PROCEDURE — 87081 CULTURE SCREEN ONLY: CPT

## 2020-06-10 PROCEDURE — 85025 COMPLETE CBC W/AUTO DIFF WBC: CPT

## 2020-06-10 PROCEDURE — 84145 PROCALCITONIN (PCT): CPT

## 2020-06-10 PROCEDURE — 90732 PPSV23 VACC 2 YRS+ SUBQ/IM: CPT

## 2020-06-10 PROCEDURE — 80048 BASIC METABOLIC PNL TOTAL CA: CPT

## 2020-06-10 PROCEDURE — 83615 LACTATE (LD) (LDH) ENZYME: CPT

## 2020-06-10 PROCEDURE — 87040 BLOOD CULTURE FOR BACTERIA: CPT

## 2020-06-10 PROCEDURE — 93005 ELECTROCARDIOGRAM TRACING: CPT

## 2020-06-10 PROCEDURE — 83880 ASSAY OF NATRIURETIC PEPTIDE: CPT

## 2020-06-10 PROCEDURE — 84484 ASSAY OF TROPONIN QUANT: CPT

## 2020-06-10 PROCEDURE — 36415 COLL VENOUS BLD VENIPUNCTURE: CPT

## 2020-06-10 PROCEDURE — 86140 C-REACTIVE PROTEIN: CPT

## 2020-06-10 PROCEDURE — 71045 X-RAY EXAM CHEST 1 VIEW: CPT

## 2020-06-10 PROCEDURE — 81001 URINALYSIS AUTO W/SCOPE: CPT

## 2020-06-10 PROCEDURE — 85610 PROTHROMBIN TIME: CPT

## 2020-06-10 RX ADMIN — SODIUM CHLORIDE SCH MLS/HR: 9 INJECTION INTRAMUSCULAR; INTRAVENOUS; SUBCUTANEOUS at 18:21

## 2020-06-10 NOTE — NUR
Arrived@1900 to [facility] rm 3008

Patient walked from [facility] to the bed. Gait is slow but steady.

Educated on MRSA swab and tele monitoring. Oriented to room and call light.

Vital signs stable and entered. A&Ox4

## 2020-06-10 NOTE — NUR
Patient in room ED 3. I have received report from   Kathleen LEBRON RN in ER and had the 
opportunity to ask questions and assume patient care.

## 2020-06-10 NOTE — NUR
Phoned Pharmacy regarding the dose of remdesivir that shows pending since 1640. 
Pharmacy staff reports the medication is not available at this facility and has 
been requested from another [facility] but we are not sure when 
it will arrive here to be administered.

## 2020-06-10 NOTE — NUR
Called Pharmacy for the Remdesir medication and it's still not here. And they are not sure 
if the medication will arrive tonight or tomorrow from the other facility.

## 2020-06-11 VITALS — DIASTOLIC BLOOD PRESSURE: 92 MMHG | SYSTOLIC BLOOD PRESSURE: 148 MMHG

## 2020-06-11 VITALS — SYSTOLIC BLOOD PRESSURE: 127 MMHG | DIASTOLIC BLOOD PRESSURE: 84 MMHG

## 2020-06-11 VITALS — SYSTOLIC BLOOD PRESSURE: 144 MMHG | DIASTOLIC BLOOD PRESSURE: 92 MMHG

## 2020-06-11 VITALS — DIASTOLIC BLOOD PRESSURE: 85 MMHG | SYSTOLIC BLOOD PRESSURE: 134 MMHG

## 2020-06-11 VITALS — SYSTOLIC BLOOD PRESSURE: 142 MMHG | DIASTOLIC BLOOD PRESSURE: 93 MMHG

## 2020-06-11 VITALS — DIASTOLIC BLOOD PRESSURE: 69 MMHG | SYSTOLIC BLOOD PRESSURE: 136 MMHG

## 2020-06-11 LAB
ALBUMIN SERPL BCP-MCNC: 2.5 G/DL (ref 3.4–5)
ANION GAP SERPL CALCULATED.3IONS-SCNC: 8 MMOL/L (ref 8–16)
BASOPHILS # BLD AUTO: 0 X10'3 (ref 0–0.2)
BASOPHILS NFR BLD AUTO: 0.3 % (ref 0–1)
BUN SERPL-MCNC: 17 MG/DL (ref 7–18)
BUN/CREAT SERPL: 13.3 (ref 5.4–32)
CALCIUM SERPL-MCNC: 7.1 MG/DL (ref 8.5–10.1)
CHLORIDE SERPL-SCNC: 110 MMOL/L (ref 99–107)
CO2 SERPL-SCNC: 25.7 MMOL/L (ref 24–32)
CREAT SERPL-MCNC: 1.28 MG/DL (ref 0.6–1.1)
CRP SERPL HS-MCNC: 7.26 MG/DL (ref 0–0.5)
EOSINOPHIL # BLD AUTO: 0 X10'3 (ref 0–0.9)
EOSINOPHIL NFR BLD AUTO: 0.3 % (ref 0–6)
ERYTHROCYTE [DISTWIDTH] IN BLOOD BY AUTOMATED COUNT: 14 % (ref 11.5–14.5)
GFR SERPL CREATININE-BSD FRML MDRD: 57 ML/MIN
GLUCOSE SERPL-MCNC: 76 MG/DL (ref 70–104)
HCT VFR BLD AUTO: 39.1 % (ref 42–52)
HGB BLD-MCNC: 13.1 G/DL (ref 14–17.9)
LDH SERPL-CCNC: 238 U/L (ref 85–227)
LYMPHOCYTES # BLD AUTO: 0.7 X10'3 (ref 1.1–4.8)
LYMPHOCYTES NFR BLD AUTO: 23 % (ref 21–51)
MCH RBC QN AUTO: 28.9 PG (ref 27–31)
MCHC RBC AUTO-ENTMCNC: 33.4 G/DL (ref 33–36.5)
MCV RBC AUTO: 86.5 FL (ref 78–98)
MONOCYTES # BLD AUTO: 0.4 X10'3 (ref 0–0.9)
MONOCYTES NFR BLD AUTO: 13.2 % (ref 2–12)
NEUTROPHILS # BLD AUTO: 2 X10'3 (ref 1.8–7.7)
NEUTROPHILS NFR BLD AUTO: 63.2 % (ref 42–75)
PLATELET # BLD AUTO: 145 X10'3 (ref 140–440)
PMV BLD AUTO: 7.9 FL (ref 7.4–10.4)
POTASSIUM SERPL-SCNC: 3.3 MMOL/L (ref 3.5–5.1)
RBC # BLD AUTO: 4.52 X10'6 (ref 4.7–6.1)
SODIUM SERPL-SCNC: 144 MMOL/L (ref 135–145)
WBC # BLD AUTO: 3.1 X10'3 (ref 4.5–11)

## 2020-06-11 RX ADMIN — ENOXAPARIN SODIUM SCH MG: 100 INJECTION SUBCUTANEOUS at 07:32

## 2020-06-11 RX ADMIN — POTASSIUM CHLORIDE PRN MEQ: 1500 TABLET, EXTENDED RELEASE ORAL at 16:08

## 2020-06-11 RX ADMIN — POTASSIUM CHLORIDE PRN MEQ: 1500 TABLET, EXTENDED RELEASE ORAL at 19:47

## 2020-06-11 RX ADMIN — POTASSIUM CHLORIDE PRN MEQ: 1500 TABLET, EXTENDED RELEASE ORAL at 11:12

## 2020-06-11 RX ADMIN — SODIUM CHLORIDE SCH MLS/HR: 9 INJECTION INTRAMUSCULAR; INTRAVENOUS; SUBCUTANEOUS at 04:00

## 2020-06-11 RX ADMIN — GUAIFENESIN PRN MG: 200 SOLUTION ORAL at 23:08

## 2020-06-11 NOTE — NUR
Problems reprioritized. Patient report given, questions answered & plan of care reviewed 
with SANDRA Lentz.

## 2020-06-11 NOTE — NUR
Problems reprioritized. Patient report given, questions answered & plan of care reviewed 
with Maisha RN. Patient laying in bed, awake, alert, complains of coughing a lot, no other 
complaints. Stable at time of shift change.

## 2020-06-11 NOTE — NUR
PAGER ID:  [phone] 

MESSAGE:  Mary Carmen armas [phone]. RE KATIE Whitaker 3008. Order for NS @ 100 timed out on MAR. Did you 
want to continue IVF? At what rate? Or discontinue? Thanks!

## 2020-06-11 NOTE — NUR
MD called back and gave orders for Robutussin as an expectorant. Patient has strong cough 
but unable to spit anything out.

## 2020-06-11 NOTE — NUR
Patient in room PCU 3008. I have received report from Maisha FLORES and had the opportunity to 
ask questions and assume patient care. Patient laying in bed, eyes closed, no signs of 
distress. Per report, still awaiting arrival of IV remdesivir from another [facility] facility.

## 2020-06-11 NOTE — NUR
PAGER ID:  [phone] 

MESSAGE:  Mary Carmen armas [phone]. RE KATIE Whitaker [phone]. Per Dr. Vieira note, recommends d/c IV fluids. 
NS@70 currently. Do you want d/c fluids? Thanks

## 2020-06-11 NOTE — NUR
Patient in room PCU 3008. I have received report from   SANDRA Lentz and had the opportunity to 
ask questions and assume patient care.

## 2020-06-12 VITALS — SYSTOLIC BLOOD PRESSURE: 143 MMHG | DIASTOLIC BLOOD PRESSURE: 91 MMHG

## 2020-06-12 VITALS — DIASTOLIC BLOOD PRESSURE: 86 MMHG | SYSTOLIC BLOOD PRESSURE: 136 MMHG

## 2020-06-12 VITALS — SYSTOLIC BLOOD PRESSURE: 149 MMHG | DIASTOLIC BLOOD PRESSURE: 97 MMHG

## 2020-06-12 VITALS — DIASTOLIC BLOOD PRESSURE: 77 MMHG | SYSTOLIC BLOOD PRESSURE: 130 MMHG

## 2020-06-12 VITALS — SYSTOLIC BLOOD PRESSURE: 141 MMHG | DIASTOLIC BLOOD PRESSURE: 88 MMHG

## 2020-06-12 LAB
ALBUMIN SERPL BCP-MCNC: 2.8 G/DL (ref 3.4–5)
ANION GAP SERPL CALCULATED.3IONS-SCNC: 6 MMOL/L (ref 8–16)
BASOPHILS # BLD AUTO: 0 X10'3 (ref 0–0.2)
BASOPHILS NFR BLD AUTO: 0.4 % (ref 0–1)
BUN SERPL-MCNC: 18 MG/DL (ref 7–18)
BUN/CREAT SERPL: 12.6 (ref 5.4–32)
CALCIUM SERPL-MCNC: 8 MG/DL (ref 8.5–10.1)
CHLORIDE SERPL-SCNC: 108 MMOL/L (ref 99–107)
CO2 SERPL-SCNC: 28 MMOL/L (ref 24–32)
CREAT SERPL-MCNC: 1.43 MG/DL (ref 0.6–1.1)
CRP SERPL HS-MCNC: 5.92 MG/DL (ref 0–0.5)
EOSINOPHIL # BLD AUTO: 0 X10'3 (ref 0–0.9)
EOSINOPHIL NFR BLD AUTO: 0.8 % (ref 0–6)
ERYTHROCYTE [DISTWIDTH] IN BLOOD BY AUTOMATED COUNT: 14.1 % (ref 11.5–14.5)
GFR SERPL CREATININE-BSD FRML MDRD: 50 ML/MIN
GLUCOSE SERPL-MCNC: 97 MG/DL (ref 70–104)
HCT VFR BLD AUTO: 42 % (ref 42–52)
HGB BLD-MCNC: 14.1 G/DL (ref 14–17.9)
LDH SERPL-CCNC: 296 U/L (ref 85–227)
LYMPHOCYTES # BLD AUTO: 0.7 X10'3 (ref 1.1–4.8)
LYMPHOCYTES NFR BLD AUTO: 18.9 % (ref 21–51)
MAGNESIUM SERPL-MCNC: 1.7 MG/DL (ref 1.5–2.4)
MCH RBC QN AUTO: 29 PG (ref 27–31)
MCHC RBC AUTO-ENTMCNC: 33.6 G/DL (ref 33–36.5)
MCV RBC AUTO: 86.3 FL (ref 78–98)
MONOCYTES # BLD AUTO: 0.5 X10'3 (ref 0–0.9)
MONOCYTES NFR BLD AUTO: 12.5 % (ref 2–12)
NEUTROPHILS # BLD AUTO: 2.5 X10'3 (ref 1.8–7.7)
NEUTROPHILS NFR BLD AUTO: 67.4 % (ref 42–75)
PLATELET # BLD AUTO: 162 X10'3 (ref 140–440)
PMV BLD AUTO: 7.6 FL (ref 7.4–10.4)
POTASSIUM SERPL-SCNC: 4.1 MMOL/L (ref 3.5–5.1)
RBC # BLD AUTO: 4.87 X10'6 (ref 4.7–6.1)
SODIUM SERPL-SCNC: 142 MMOL/L (ref 135–145)
WBC # BLD AUTO: 3.7 X10'3 (ref 4.5–11)

## 2020-06-12 PROCEDURE — 3E0234Z INTRODUCTION OF SERUM, TOXOID AND VACCINE INTO MUSCLE, PERCUTANEOUS APPROACH: ICD-10-PCS | Performed by: INTERNAL MEDICINE

## 2020-06-12 RX ADMIN — ENOXAPARIN SODIUM SCH MG: 100 INJECTION SUBCUTANEOUS at 07:27

## 2020-06-12 RX ADMIN — GUAIFENESIN AND CODEINE PHOSPHATE PRN ML: 10; 100 LIQUID ORAL at 12:36

## 2020-06-12 RX ADMIN — SODIUM CHLORIDE SCH MLS/HR: 9 INJECTION INTRAMUSCULAR; INTRAVENOUS; SUBCUTANEOUS at 10:19

## 2020-06-12 RX ADMIN — GUAIFENESIN AND CODEINE PHOSPHATE PRN ML: 10; 100 LIQUID ORAL at 20:51

## 2020-06-12 RX ADMIN — GUAIFENESIN AND CODEINE PHOSPHATE PRN ML: 10; 100 LIQUID ORAL at 12:29

## 2020-06-12 RX ADMIN — GUAIFENESIN PRN MG: 200 SOLUTION ORAL at 07:26

## 2020-06-12 NOTE — NUR
Problems reprioritized. Patient report given, questions answered & plan of care reviewed 
with SANDRA Lentz. 

-------------------------------------------------------------------------------

Addendum: 06/12/20 at 1828 by Maisha Palma RN

-------------------------------------------------------------------------------

Patient in room [facility] 300. I have received report from SANDRA Lentz   and had the opportunity to 
ask questions and assume patient care.

## 2020-06-12 NOTE — NUR
PAGER ID:  [phone] 

MESSAGE:  Mary Carmen armas [phone]. RE KATIE Whitaker 3008. Pt requesting something for headache, like 
Tylenol or IBU. Has order for Tylenol for fever. Can I get order for something for pain? 
thanks!

## 2020-06-12 NOTE — NUR
Problems reprioritized. Patient report given, questions answered & plan of care reviewed 
with Maisha FLORES.

## 2020-06-12 NOTE — NUR
Patient in room PCU 3008. I have received report from Maisha FLORES and had the opportunity to 
ask questions and assume patient care. Patient laying in bed, eyes closed, no signs of 
distress, will continue to monitor.

## 2020-06-13 VITALS — SYSTOLIC BLOOD PRESSURE: 137 MMHG | DIASTOLIC BLOOD PRESSURE: 95 MMHG

## 2020-06-13 VITALS — SYSTOLIC BLOOD PRESSURE: 143 MMHG | DIASTOLIC BLOOD PRESSURE: 96 MMHG

## 2020-06-13 VITALS — SYSTOLIC BLOOD PRESSURE: 160 MMHG | DIASTOLIC BLOOD PRESSURE: 92 MMHG

## 2020-06-13 LAB
ALBUMIN SERPL BCP-MCNC: 3 G/DL (ref 3.4–5)
ANION GAP SERPL CALCULATED.3IONS-SCNC: 4 MMOL/L (ref 8–16)
BASOPHILS # BLD AUTO: 0 X10'3 (ref 0–0.2)
BASOPHILS NFR BLD AUTO: 0.3 % (ref 0–1)
BUN SERPL-MCNC: 18 MG/DL (ref 7–18)
BUN/CREAT SERPL: 13.5 (ref 5.4–32)
CALCIUM SERPL-MCNC: 8.6 MG/DL (ref 8.5–10.1)
CHLORIDE SERPL-SCNC: 108 MMOL/L (ref 99–107)
CO2 SERPL-SCNC: 29.9 MMOL/L (ref 24–32)
CREAT SERPL-MCNC: 1.33 MG/DL (ref 0.6–1.1)
CRP SERPL HS-MCNC: 5.88 MG/DL (ref 0–0.5)
EOSINOPHIL # BLD AUTO: 0.1 X10'3 (ref 0–0.9)
EOSINOPHIL NFR BLD AUTO: 2.6 % (ref 0–6)
ERYTHROCYTE [DISTWIDTH] IN BLOOD BY AUTOMATED COUNT: 13.7 % (ref 11.5–14.5)
GFR SERPL CREATININE-BSD FRML MDRD: 55 ML/MIN
GLUCOSE SERPL-MCNC: 90 MG/DL (ref 70–104)
HCT VFR BLD AUTO: 44.7 % (ref 42–52)
HGB BLD-MCNC: 14.8 G/DL (ref 14–17.9)
LDH SERPL-CCNC: 296 U/L (ref 85–227)
LYMPHOCYTES # BLD AUTO: 0.7 X10'3 (ref 1.1–4.8)
LYMPHOCYTES NFR BLD AUTO: 16.4 % (ref 21–51)
MAGNESIUM SERPL-MCNC: 1.8 MG/DL (ref 1.5–2.4)
MCH RBC QN AUTO: 28.6 PG (ref 27–31)
MCHC RBC AUTO-ENTMCNC: 33.1 G/DL (ref 33–36.5)
MCV RBC AUTO: 86.2 FL (ref 78–98)
MONOCYTES # BLD AUTO: 0.5 X10'3 (ref 0–0.9)
MONOCYTES NFR BLD AUTO: 11.4 % (ref 2–12)
NEUTROPHILS # BLD AUTO: 2.8 X10'3 (ref 1.8–7.7)
NEUTROPHILS NFR BLD AUTO: 69.3 % (ref 42–75)
PLATELET # BLD AUTO: 181 X10'3 (ref 140–440)
PMV BLD AUTO: 7.5 FL (ref 7.4–10.4)
POTASSIUM SERPL-SCNC: 4 MMOL/L (ref 3.5–5.1)
RBC # BLD AUTO: 5.18 X10'6 (ref 4.7–6.1)
SODIUM SERPL-SCNC: 142 MMOL/L (ref 135–145)
WBC # BLD AUTO: 4 X10'3 (ref 4.5–11)

## 2020-06-13 RX ADMIN — SODIUM CHLORIDE SCH MLS/HR: 9 INJECTION INTRAMUSCULAR; INTRAVENOUS; SUBCUTANEOUS at 04:24

## 2020-06-13 RX ADMIN — GUAIFENESIN AND CODEINE PHOSPHATE PRN ML: 10; 100 LIQUID ORAL at 03:01

## 2020-06-13 RX ADMIN — ENOXAPARIN SODIUM SCH MG: 100 INJECTION SUBCUTANEOUS at 08:30

## 2020-06-13 RX ADMIN — GUAIFENESIN AND CODEINE PHOSPHATE PRN ML: 10; 100 LIQUID ORAL at 10:44

## 2020-06-13 NOTE — NUR
Patient in room PCU 3008. I have received report from Maisha FLORES and had the opportunity to 
ask questions and assume patient care.

## 2020-06-13 NOTE — NUR
PAGER ID: [phone]

MESSAGE: Abdoul Whitaker [phone], here for +covid. Patient high BP. Dose in the eMar is lower 
than his home med dose of Lisinipril/Hctz tab, Can I change it?

## 2020-06-13 NOTE — NUR
Per MD order by Dr. Marlow, patient is stable for discharge home. Discharge packet printed and 
reviewed with the patient. IV removed. Tele monitor removed. Prescriptions called to 
pharmacy, paper prescription also sent with patient. All belongings sent with patient. 
Disease process, need for self isolation/quarantine discussed with patient, as well as 
return precautions and follow up discussed. All questions answered, patient indicated 
understanding. Patient escorted via wheelchair to private vehicle to go home with family.

## 2020-08-03 ENCOUNTER — HOSPITAL ENCOUNTER (OUTPATIENT)
Dept: HOSPITAL 94 - LAB | Age: 60
Discharge: HOME | End: 2020-08-03
Attending: FAMILY MEDICINE
Payer: COMMERCIAL

## 2020-08-03 DIAGNOSIS — J80: ICD-10-CM

## 2020-08-03 DIAGNOSIS — A08.39: ICD-10-CM

## 2020-08-03 DIAGNOSIS — Z91.09: ICD-10-CM

## 2020-08-03 DIAGNOSIS — R53.83: ICD-10-CM

## 2020-08-03 DIAGNOSIS — N18.1: ICD-10-CM

## 2020-08-03 DIAGNOSIS — M16.10: ICD-10-CM

## 2020-08-03 DIAGNOSIS — Z87.442: ICD-10-CM

## 2020-08-03 DIAGNOSIS — I12.9: ICD-10-CM

## 2020-08-03 DIAGNOSIS — Z00.00: Primary | ICD-10-CM

## 2020-08-03 DIAGNOSIS — E78.5: ICD-10-CM

## 2020-08-03 LAB
ALBUMIN SERPL BCP-MCNC: 3.7 G/DL (ref 3.4–5)
ALBUMIN/GLOB SERPL: 1.1 {RATIO} (ref 1.1–1.5)
ALP SERPL-CCNC: 77 IU/L (ref 46–116)
ALT SERPL W P-5'-P-CCNC: 46 U/L (ref 12–78)
ANION GAP SERPL CALCULATED.3IONS-SCNC: 10 MMOL/L (ref 8–16)
AST SERPL W P-5'-P-CCNC: 22 U/L (ref 10–37)
BASOPHILS # BLD AUTO: 0 X10'3 (ref 0–0.2)
BASOPHILS NFR BLD AUTO: 0.7 % (ref 0–1)
BILIRUB SERPL-MCNC: 0.2 MG/DL (ref 0.1–1)
BUN SERPL-MCNC: 19 MG/DL (ref 7–18)
BUN/CREAT SERPL: 12.6 (ref 5.4–32)
CALCIUM SERPL-MCNC: 9.1 MG/DL (ref 8.5–10.1)
CHLORIDE SERPL-SCNC: 107 MMOL/L (ref 99–107)
CHOLEST SERPL-MCNC: 194 MG/DL (ref 0–200)
CHOLEST/HDLC SERPL: 5.2 {RATIO} (ref 0–4.99)
CLARITY UR: CLEAR
CO2 SERPL-SCNC: 24.9 MMOL/L (ref 24–32)
COLOR UR: YELLOW
CREAT SERPL-MCNC: 1.51 MG/DL (ref 0.6–1.1)
CRP SERPL HS-MCNC: 0.53 MG/DL (ref 0–0.5)
EOSINOPHIL # BLD AUTO: 0.3 X10'3 (ref 0–0.9)
EOSINOPHIL NFR BLD AUTO: 5.1 % (ref 0–6)
ERYTHROCYTE [DISTWIDTH] IN BLOOD BY AUTOMATED COUNT: 14.6 % (ref 11.5–14.5)
GFR SERPL CREATININE-BSD FRML MDRD: 47 ML/MIN
GLUCOSE SERPL-MCNC: 103 MG/DL (ref 70–104)
GLUCOSE UR STRIP-MCNC: NEGATIVE MG/DL
HCT VFR BLD AUTO: 43.6 % (ref 42–52)
HDLC SERPL-MCNC: 37 MG/DL (ref 35–60)
HGB BLD-MCNC: 14.6 G/DL (ref 14–17.9)
HGB UR QL STRIP: NEGATIVE
IRON SATN MFR SERPL: 24 % (ref 11–46)
IRON SATN MFR SERPL: 297 UG/DL (ref 259–388)
IRON SERPL-MCNC: 71 UG/DL (ref 53–167)
KETONES UR STRIP-MCNC: NEGATIVE MG/DL
LDLC SERPL DIRECT ASSAY-MCNC: 121 MG/DL (ref 50–100)
LEUKOCYTE ESTERASE UR QL STRIP: NEGATIVE
LYMPHOCYTES # BLD AUTO: 1.1 X10'3 (ref 1.1–4.8)
LYMPHOCYTES NFR BLD AUTO: 18.4 % (ref 21–51)
MCH RBC QN AUTO: 29 PG (ref 27–31)
MCHC RBC AUTO-ENTMCNC: 33.5 G/DL (ref 33–36.5)
MCV RBC AUTO: 86.6 FL (ref 78–98)
MONOCYTES # BLD AUTO: 0.6 X10'3 (ref 0–0.9)
MONOCYTES NFR BLD AUTO: 9.8 % (ref 2–12)
NEUTROPHILS # BLD AUTO: 4.1 X10'3 (ref 1.8–7.7)
NEUTROPHILS NFR BLD AUTO: 66 % (ref 42–75)
NITRITE UR QL STRIP: NEGATIVE
PH UR STRIP: 5.5 [PH] (ref 4.8–8)
PLATELET # BLD AUTO: 216 X10'3 (ref 140–440)
PMV BLD AUTO: 7.8 FL (ref 7.4–10.4)
POTASSIUM SERPL-SCNC: 3.6 MMOL/L (ref 3.5–5.1)
PROT SERPL-MCNC: 7.2 G/DL (ref 6.4–8.2)
PROT UR QL STRIP: NEGATIVE MG/DL
RBC # BLD AUTO: 5.04 X10'6 (ref 4.7–6.1)
SODIUM SERPL-SCNC: 142 MMOL/L (ref 135–145)
SP GR UR STRIP: >=1.03 (ref 1–1.03)
TRIGL SERPL-MCNC: 202 MG/DL (ref 20–135)
URN COLLECT METHOD CLASS: (no result)
UROBILINOGEN UR STRIP-MCNC: 0.2 E.U/DL (ref 0.2–1)
WBC # BLD AUTO: 6.2 X10'3 (ref 4.5–11)

## 2020-08-03 PROCEDURE — 86803 HEPATITIS C AB TEST: CPT

## 2020-08-03 PROCEDURE — 83970 ASSAY OF PARATHORMONE: CPT

## 2020-08-03 PROCEDURE — 82570 ASSAY OF URINE CREATININE: CPT

## 2020-08-03 PROCEDURE — 83880 ASSAY OF NATRIURETIC PEPTIDE: CPT

## 2020-08-03 PROCEDURE — 84402 ASSAY OF FREE TESTOSTERONE: CPT

## 2020-08-03 PROCEDURE — 36415 COLL VENOUS BLD VENIPUNCTURE: CPT

## 2020-08-03 PROCEDURE — 80061 LIPID PANEL: CPT

## 2020-08-03 PROCEDURE — 84443 ASSAY THYROID STIM HORMONE: CPT

## 2020-08-03 PROCEDURE — 83540 ASSAY OF IRON: CPT

## 2020-08-03 PROCEDURE — 86140 C-REACTIVE PROTEIN: CPT

## 2020-08-03 PROCEDURE — 85025 COMPLETE CBC W/AUTO DIFF WBC: CPT

## 2020-08-03 PROCEDURE — 81003 URINALYSIS AUTO W/O SCOPE: CPT

## 2020-08-03 PROCEDURE — 82746 ASSAY OF FOLIC ACID SERUM: CPT

## 2020-08-03 PROCEDURE — 80053 COMPREHEN METABOLIC PANEL: CPT

## 2020-08-03 PROCEDURE — 82306 VITAMIN D 25 HYDROXY: CPT

## 2020-08-03 PROCEDURE — 85651 RBC SED RATE NONAUTOMATED: CPT

## 2020-08-03 PROCEDURE — 82043 UR ALBUMIN QUANTITATIVE: CPT

## 2020-08-03 PROCEDURE — 82330 ASSAY OF CALCIUM: CPT

## 2020-08-03 PROCEDURE — 82607 VITAMIN B-12: CPT

## 2020-08-03 PROCEDURE — 84439 ASSAY OF FREE THYROXINE: CPT

## 2020-08-03 PROCEDURE — 83550 IRON BINDING TEST: CPT

## 2020-08-03 PROCEDURE — 84403 ASSAY OF TOTAL TESTOSTERONE: CPT

## 2020-08-05 LAB — HEPATITIS C ANTIBODY: <0.1 S/CO RATIO (ref 0–0.9)

## 2020-08-06 LAB — MICROALBUMIN/CREAT UR: 4 MG/G CREAT (ref 0–29)

## 2020-08-07 LAB — TESTOST FREE SERPL-MCNC: 29 PG/ML (ref 6.6–18.1)

## 2020-12-15 ENCOUNTER — HOSPITAL ENCOUNTER (OUTPATIENT)
Dept: HOSPITAL 94 - LAB | Age: 60
Discharge: HOME | End: 2020-12-15
Attending: INTERNAL MEDICINE
Payer: COMMERCIAL

## 2020-12-15 DIAGNOSIS — U07.1: Primary | ICD-10-CM

## 2020-12-15 PROCEDURE — 86769 SARS-COV-2 COVID-19 ANTIBODY: CPT

## 2020-12-15 PROCEDURE — 36415 COLL VENOUS BLD VENIPUNCTURE: CPT

## 2021-04-09 ENCOUNTER — HOSPITAL ENCOUNTER (OUTPATIENT)
Dept: HOSPITAL 94 - RAD | Age: 61
Discharge: HOME | End: 2021-04-09
Attending: ORTHOPAEDIC SURGERY
Payer: COMMERCIAL

## 2021-04-09 DIAGNOSIS — M25.511: ICD-10-CM

## 2021-04-09 DIAGNOSIS — M75.101: Primary | ICD-10-CM

## 2021-04-09 PROCEDURE — 73221 MRI JOINT UPR EXTREM W/O DYE: CPT

## 2021-07-17 ENCOUNTER — HOSPITAL ENCOUNTER (EMERGENCY)
Dept: HOSPITAL 94 - ER | Age: 61
Discharge: HOME | End: 2021-07-17
Payer: COMMERCIAL

## 2021-07-17 VITALS — SYSTOLIC BLOOD PRESSURE: 161 MMHG | DIASTOLIC BLOOD PRESSURE: 102 MMHG

## 2021-07-17 VITALS — HEIGHT: 72 IN | WEIGHT: 235.89 LBS | BODY MASS INDEX: 31.95 KG/M2

## 2021-07-17 DIAGNOSIS — I10: ICD-10-CM
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
## 2021-07-17 DIAGNOSIS — S46.292A: Primary | ICD-10-CM
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
## 2021-07-17 DIAGNOSIS — Z87.442: ICD-10-CM
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
## 2021-07-17 PROCEDURE — 99283 EMERGENCY DEPT VISIT LOW MDM: CPT

## 2021-07-17 PROCEDURE — 73080 X-RAY EXAM OF ELBOW: CPT

## 2021-07-23 ENCOUNTER — HOSPITAL ENCOUNTER (OUTPATIENT)
Dept: HOSPITAL 94 - PAS | Age: 61
Discharge: HOME | End: 2021-07-23
Attending: ORTHOPAEDIC SURGERY
Payer: COMMERCIAL

## 2021-07-23 VITALS — SYSTOLIC BLOOD PRESSURE: 165 MMHG | DIASTOLIC BLOOD PRESSURE: 108 MMHG

## 2021-07-23 VITALS — WEIGHT: 240.74 LBS | HEIGHT: 72 IN | BODY MASS INDEX: 32.61 KG/M2

## 2021-07-23 VITALS — DIASTOLIC BLOOD PRESSURE: 97 MMHG | SYSTOLIC BLOOD PRESSURE: 138 MMHG

## 2021-07-23 VITALS — DIASTOLIC BLOOD PRESSURE: 99 MMHG | SYSTOLIC BLOOD PRESSURE: 152 MMHG

## 2021-07-23 DIAGNOSIS — E66.9: ICD-10-CM
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
## 2021-07-23 DIAGNOSIS — Z96.649: ICD-10-CM

## 2021-07-23 DIAGNOSIS — I10: ICD-10-CM

## 2021-07-23 DIAGNOSIS — S46.212A: Primary | ICD-10-CM
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
## 2021-07-23 DIAGNOSIS — G89.18: ICD-10-CM

## 2021-07-23 LAB
ALBUMIN SERPL BCP-MCNC: 3.9 G/DL (ref 3.4–5)
ALBUMIN/GLOB SERPL: 1.1 {RATIO} (ref 1.1–1.5)
ALP SERPL-CCNC: 108 IU/L (ref 46–116)
ALT SERPL W P-5'-P-CCNC: 40 U/L (ref 30–65)
ANION GAP SERPL CALCULATED.3IONS-SCNC: 9 MMOL/L (ref 8–16)
AST SERPL W P-5'-P-CCNC: 19 U/L (ref 10–37)
BASOPHILS # BLD AUTO: 0 X10'3 (ref 0–0.2)
BASOPHILS NFR BLD AUTO: 0.8 % (ref 0–1)
BILIRUB SERPL-MCNC: 0.5 MG/DL (ref 0–1)
BUN SERPL-MCNC: 19 MG/DL (ref 7–18)
BUN/CREAT SERPL: 14.8 (ref 5.4–32)
CALCIUM SERPL-MCNC: 9 MG/DL (ref 8.5–10.1)
CHLORIDE SERPL-SCNC: 105 MMOL/L (ref 99–107)
CO2 SERPL-SCNC: 27.4 MMOL/L (ref 24–32)
CREAT SERPL-MCNC: 1.28 MG/DL (ref 0.6–1.1)
EOSINOPHIL # BLD AUTO: 0.3 X10'3 (ref 0–0.9)
EOSINOPHIL NFR BLD AUTO: 6 % (ref 0–6)
ERYTHROCYTE [DISTWIDTH] IN BLOOD BY AUTOMATED COUNT: 13.7 % (ref 11.5–14.5)
GFR SERPL CREATININE-BSD FRML MDRD: 57 ML/MIN
GLUCOSE SERPL-MCNC: 87 MG/DL (ref 70–104)
HCT VFR BLD AUTO: 48.6 % (ref 42–52)
HGB BLD-MCNC: 16.4 G/DL (ref 14–17.9)
LYMPHOCYTES # BLD AUTO: 1.1 X10'3 (ref 1.1–4.8)
LYMPHOCYTES NFR BLD AUTO: 18.2 % (ref 21–51)
MCH RBC QN AUTO: 29.3 PG (ref 27–31)
MCHC RBC AUTO-ENTMCNC: 33.7 G/DL (ref 33–36.5)
MCV RBC AUTO: 86.7 FL (ref 78–98)
MONOCYTES # BLD AUTO: 0.6 X10'3 (ref 0–0.9)
MONOCYTES NFR BLD AUTO: 11 % (ref 2–12)
NEUTROPHILS # BLD AUTO: 3.7 X10'3 (ref 1.8–7.7)
NEUTROPHILS NFR BLD AUTO: 64 % (ref 42–75)
PLATELET # BLD AUTO: 238 X10'3 (ref 140–440)
PMV BLD AUTO: 7.3 FL (ref 7.4–10.4)
POTASSIUM SERPL-SCNC: 4.1 MMOL/L (ref 3.4–5.1)
PROT SERPL-MCNC: 7.3 G/DL (ref 6.4–8.2)
RBC # BLD AUTO: 5.61 X10'6 (ref 4.7–6.1)
SODIUM SERPL-SCNC: 141 MMOL/L (ref 135–145)

## 2021-07-23 PROCEDURE — 80053 COMPREHEN METABOLIC PANEL: CPT

## 2021-07-23 PROCEDURE — 36415 COLL VENOUS BLD VENIPUNCTURE: CPT

## 2021-07-23 PROCEDURE — 93005 ELECTROCARDIOGRAM TRACING: CPT

## 2021-07-23 PROCEDURE — 76942 ECHO GUIDE FOR BIOPSY: CPT

## 2021-07-23 PROCEDURE — 64415 NJX AA&/STRD BRCH PLXS IMG: CPT

## 2021-07-23 PROCEDURE — 85025 COMPLETE CBC W/AUTO DIFF WBC: CPT

## 2021-07-23 PROCEDURE — 24342 REPAIR OF RUPTURED TENDON: CPT

## 2021-07-23 NOTE — NUR
RECEIVED REPORT ASSUME CARE PT AROUSABLE TO NAME VSS NO DISTRESS.  DRESSING TO LEFT ARM 
INTACT +CMS TO LEFT HAND AND FINGERS.  CONT TO MONITOR

-------------------------------------------------------------------------------

Addendum: 07/23/21 at 1714 by Esther Dahl RN

-------------------------------------------------------------------------------

Amended: Links added.

## 2022-01-26 ENCOUNTER — HOSPITAL ENCOUNTER (OUTPATIENT)
Dept: HOSPITAL 94 - LAB | Age: 62
Discharge: HOME | End: 2022-01-26
Attending: FAMILY MEDICINE
Payer: COMMERCIAL

## 2022-01-26 DIAGNOSIS — J80: ICD-10-CM

## 2022-01-26 DIAGNOSIS — Z91.09: ICD-10-CM

## 2022-01-26 DIAGNOSIS — E83.51: ICD-10-CM

## 2022-01-26 DIAGNOSIS — M16.10: ICD-10-CM

## 2022-01-26 DIAGNOSIS — I12.9: Primary | ICD-10-CM

## 2022-01-26 DIAGNOSIS — N18.30: ICD-10-CM

## 2022-01-26 DIAGNOSIS — R53.83: ICD-10-CM

## 2022-01-26 DIAGNOSIS — E78.5: ICD-10-CM

## 2022-01-26 DIAGNOSIS — A08.39: ICD-10-CM

## 2022-01-26 DIAGNOSIS — E29.8: ICD-10-CM

## 2022-01-26 DIAGNOSIS — R73.02: ICD-10-CM

## 2022-01-26 DIAGNOSIS — K21.9: ICD-10-CM

## 2022-01-26 DIAGNOSIS — Z91.89: ICD-10-CM

## 2022-01-26 LAB
ALBUMIN SERPL BCP-MCNC: 3.9 G/DL (ref 3.4–5)
ALBUMIN/GLOB SERPL: 1.4 {RATIO} (ref 1.1–1.5)
ALP SERPL-CCNC: 107 IU/L (ref 46–116)
ALT SERPL W P-5'-P-CCNC: 51 U/L (ref 12–78)
ANION GAP SERPL CALCULATED.3IONS-SCNC: 6 MMOL/L (ref 8–16)
AST SERPL W P-5'-P-CCNC: 30 U/L (ref 10–37)
BACTERIA URNS QL MICRO: (no result) /HPF
BASOPHILS # BLD AUTO: 0 X10'3 (ref 0–0.2)
BASOPHILS NFR BLD AUTO: 0.6 % (ref 0–1)
BILIRUB SERPL-MCNC: 0.4 MG/DL (ref 0.1–1)
BUN SERPL-MCNC: 15 MG/DL (ref 7–18)
BUN/CREAT SERPL: 11.5 (ref 5.4–32)
CA-I SERPL-SCNC: 1.29 MMOL/L (ref 1.03–1.32)
CALCIUM SERPL-MCNC: 8.6 MG/DL (ref 8.5–10.1)
CHLORIDE SERPL-SCNC: 106 MMOL/L (ref 99–107)
CHOLEST SERPL-MCNC: 164 MG/DL (ref 0–200)
CHOLEST/HDLC SERPL: 3.7 {RATIO} (ref 0–4.99)
CLARITY UR: CLEAR
CO2 SERPL-SCNC: 29.4 MMOL/L (ref 24–32)
COLOR UR: YELLOW
CREAT SERPL-MCNC: 1.3 MG/DL (ref 0.6–1.1)
CRP SERPL HS-MCNC: 0.77 MG/DL (ref 0–0.5)
DEPRECATED SQUAMOUS URNS QL MICRO: (no result) /LPF
EOSINOPHIL # BLD AUTO: 0.4 X10'3 (ref 0–0.9)
EOSINOPHIL NFR BLD AUTO: 5.8 % (ref 0–6)
ERYTHROCYTE [DISTWIDTH] IN BLOOD BY AUTOMATED COUNT: 14 % (ref 11.5–14.5)
GFR SERPL CREATININE-BSD FRML MDRD: 56 ML/MIN
GLUCOSE SERPL-MCNC: 90 MG/DL (ref 70–104)
GLUCOSE UR STRIP-MCNC: NEGATIVE MG/DL
HCT VFR BLD AUTO: 44.6 % (ref 42–52)
HDLC SERPL-MCNC: 44 MG/DL (ref 35–60)
HGB BLD-MCNC: 15.3 G/DL (ref 14–17.9)
HGB UR QL STRIP: (no result)
IRON SATN MFR SERPL: 21 % (ref 11–46)
IRON SATN MFR SERPL: 285 UG/DL (ref 259–388)
IRON SERPL-MCNC: 60 UG/DL (ref 53–167)
KETONES UR STRIP-MCNC: NEGATIVE MG/DL
LDLC SERPL DIRECT ASSAY-MCNC: 98 MG/DL (ref 50–100)
LEUKOCYTE ESTERASE UR QL STRIP: NEGATIVE
LYMPHOCYTES # BLD AUTO: 1.1 X10'3 (ref 1.1–4.8)
LYMPHOCYTES NFR BLD AUTO: 17.2 % (ref 21–51)
MCH RBC QN AUTO: 29.7 PG (ref 27–31)
MCHC RBC AUTO-ENTMCNC: 34.2 G/DL (ref 33–36.5)
MCV RBC AUTO: 86.8 FL (ref 78–98)
MONOCYTES # BLD AUTO: 0.6 X10'3 (ref 0–0.9)
MONOCYTES NFR BLD AUTO: 10.3 % (ref 2–12)
NEUTROPHILS # BLD AUTO: 4.1 X10'3 (ref 1.8–7.7)
NEUTROPHILS NFR BLD AUTO: 66.1 % (ref 42–75)
NITRITE UR QL STRIP: NEGATIVE
PH UR STRIP: 5.5 [PH] (ref 4.8–8)
PHOSPHATE SERPL-MCNC: 3.7 MG/DL (ref 2.3–4.5)
PLATELET # BLD AUTO: 202 X10'3 (ref 140–440)
PMV BLD AUTO: 7.3 FL (ref 7.4–10.4)
POTASSIUM SERPL-SCNC: 4.1 MMOL/L (ref 3.5–5.1)
PROT SERPL-MCNC: 6.7 G/DL (ref 6.4–8.2)
PROT UR QL STRIP: NEGATIVE MG/DL
RBC # BLD AUTO: 5.15 X10'6 (ref 4.7–6.1)
RBC #/AREA URNS HPF: (no result) /HPF (ref 0–2)
SODIUM SERPL-SCNC: 141 MMOL/L (ref 135–145)
SP GR UR STRIP: 1.02 (ref 1–1.03)
TRIGL SERPL-MCNC: 103 MG/DL (ref 20–135)
URN COLLECT METHOD CLASS: (no result)
UROBILINOGEN UR STRIP-MCNC: 0.2 E.U/DL (ref 0.2–1)
WBC # BLD AUTO: 6.2 X10'3 (ref 4.5–11)
WBC #/AREA URNS HPF: (no result) /HPF (ref 0–4)

## 2022-01-26 PROCEDURE — 84439 ASSAY OF FREE THYROXINE: CPT

## 2022-01-26 PROCEDURE — 86140 C-REACTIVE PROTEIN: CPT

## 2022-01-26 PROCEDURE — 82330 ASSAY OF CALCIUM: CPT

## 2022-01-26 PROCEDURE — 82607 VITAMIN B-12: CPT

## 2022-01-26 PROCEDURE — 85025 COMPLETE CBC W/AUTO DIFF WBC: CPT

## 2022-01-26 PROCEDURE — 82043 UR ALBUMIN QUANTITATIVE: CPT

## 2022-01-26 PROCEDURE — 83970 ASSAY OF PARATHORMONE: CPT

## 2022-01-26 PROCEDURE — 84550 ASSAY OF BLOOD/URIC ACID: CPT

## 2022-01-26 PROCEDURE — 87522 HEPATITIS C REVRS TRNSCRPJ: CPT

## 2022-01-26 PROCEDURE — 80061 LIPID PANEL: CPT

## 2022-01-26 PROCEDURE — 84100 ASSAY OF PHOSPHORUS: CPT

## 2022-01-26 PROCEDURE — 82746 ASSAY OF FOLIC ACID SERUM: CPT

## 2022-01-26 PROCEDURE — 81001 URINALYSIS AUTO W/SCOPE: CPT

## 2022-01-26 PROCEDURE — 83550 IRON BINDING TEST: CPT

## 2022-01-26 PROCEDURE — 82570 ASSAY OF URINE CREATININE: CPT

## 2022-01-26 PROCEDURE — 82306 VITAMIN D 25 HYDROXY: CPT

## 2022-01-26 PROCEDURE — 85651 RBC SED RATE NONAUTOMATED: CPT

## 2022-01-26 PROCEDURE — 84443 ASSAY THYROID STIM HORMONE: CPT

## 2022-01-26 PROCEDURE — 83540 ASSAY OF IRON: CPT

## 2022-01-26 PROCEDURE — 86803 HEPATITIS C AB TEST: CPT

## 2022-01-26 PROCEDURE — 36415 COLL VENOUS BLD VENIPUNCTURE: CPT

## 2022-01-26 PROCEDURE — 84403 ASSAY OF TOTAL TESTOSTERONE: CPT

## 2022-01-26 PROCEDURE — 80053 COMPREHEN METABOLIC PANEL: CPT

## 2022-01-26 PROCEDURE — 84402 ASSAY OF FREE TESTOSTERONE: CPT

## 2022-01-27 LAB
HEPATITIS C ANTIBODY: <0.1 S/CO RATIO (ref 0–0.9)
MICROALBUMIN/CREAT UR: 21 MG/G CREAT (ref 0–29)

## 2023-04-05 ENCOUNTER — HOSPITAL ENCOUNTER (OUTPATIENT)
Dept: HOSPITAL 94 - LAB | Age: 63
Discharge: HOME | End: 2023-04-05
Payer: COMMERCIAL

## 2023-04-05 DIAGNOSIS — I10: ICD-10-CM

## 2023-04-05 DIAGNOSIS — Z00.00: Primary | ICD-10-CM

## 2023-04-05 LAB
ALBUMIN SERPL BCP-MCNC: 3.5 G/DL (ref 3.4–5)
ALBUMIN/GLOB SERPL: 1.1 {RATIO} (ref 1.1–1.5)
ALP SERPL-CCNC: 95 IU/L (ref 46–116)
ALT SERPL W P-5'-P-CCNC: 47 U/L (ref 12–78)
ANION GAP SERPL CALCULATED.3IONS-SCNC: 7 MMOL/L (ref 8–16)
AST SERPL W P-5'-P-CCNC: 22 U/L (ref 10–37)
BACTERIA URNS QL MICRO: (no result) /HPF
BASOPHILS # BLD AUTO: 0 X10'3 (ref 0–0.2)
BASOPHILS NFR BLD AUTO: 0.6 % (ref 0–1)
BILIRUB SERPL-MCNC: 0.3 MG/DL (ref 0.1–1)
BUN SERPL-MCNC: 22 MG/DL (ref 7–18)
BUN/CREAT SERPL: 18.2 (ref 10–20)
CALCIUM SERPL-MCNC: 8.6 MG/DL (ref 8.5–10.1)
CHLORIDE SERPL-SCNC: 107 MMOL/L (ref 99–107)
CHOLEST SERPL-MCNC: 196 MG/DL (ref 0–200)
CHOLEST/HDLC SERPL: 5.2 {RATIO} (ref 0–4.99)
CLARITY UR: CLEAR
CO2 SERPL-SCNC: 25.9 MMOL/L (ref 24–32)
COLOR UR: YELLOW
CREAT SERPL-MCNC: 1.21 MG/DL (ref 0.6–1.1)
DEPRECATED SQUAMOUS URNS QL MICRO: (no result) /LPF
EOSINOPHIL # BLD AUTO: 0.3 X10'3 (ref 0–0.9)
EOSINOPHIL NFR BLD AUTO: 4.7 % (ref 0–6)
ERYTHROCYTE [DISTWIDTH] IN BLOOD BY AUTOMATED COUNT: 14.5 % (ref 11.5–14.5)
GFR SERPL CREATININE-BSD FRML MDRD: 61 ML/MIN
GLUCOSE SERPL-MCNC: 90 MG/DL (ref 70–104)
GLUCOSE UR STRIP-MCNC: NEGATIVE MG/DL
HCT VFR BLD AUTO: 46 % (ref 42–52)
HDLC SERPL-MCNC: 38 MG/DL (ref 35–60)
HGB BLD-MCNC: 15.4 G/DL (ref 14–17.9)
HGB UR QL STRIP: (no result)
KETONES UR STRIP-MCNC: NEGATIVE MG/DL
LDLC SERPL DIRECT ASSAY-MCNC: 115 MG/DL (ref 50–100)
LEUKOCYTE ESTERASE UR QL STRIP: NEGATIVE
LYMPHOCYTES # BLD AUTO: 1 X10'3 (ref 1.1–4.8)
LYMPHOCYTES NFR BLD AUTO: 13.5 % (ref 21–51)
MCH RBC QN AUTO: 29.2 PG (ref 27–31)
MCHC RBC AUTO-ENTMCNC: 33.5 G/DL (ref 33–36.5)
MCV RBC AUTO: 87.2 FL (ref 78–98)
MONOCYTES # BLD AUTO: 0.8 X10'3 (ref 0–0.9)
MONOCYTES NFR BLD AUTO: 10.3 % (ref 2–12)
MUCOUS THREADS URNS QL MICRO: (no result) /LPF
NEUTROPHILS # BLD AUTO: 5.2 X10'3 (ref 1.8–7.7)
NEUTROPHILS NFR BLD AUTO: 70.9 % (ref 42–75)
NITRITE UR QL STRIP: NEGATIVE
PH UR STRIP: 5.5 [PH] (ref 4.8–8)
PLATELET # BLD AUTO: 230 X10'3 (ref 140–440)
PMV BLD AUTO: 7.1 FL (ref 7.4–10.4)
POTASSIUM SERPL-SCNC: 4.2 MMOL/L (ref 3.5–5.1)
PROT SERPL-MCNC: 6.6 G/DL (ref 6.4–8.2)
PROT UR QL STRIP: NEGATIVE MG/DL
RBC # BLD AUTO: 5.28 X10'6 (ref 4.7–6.1)
RBC #/AREA URNS HPF: (no result) /HPF (ref 0–2)
SODIUM SERPL-SCNC: 140 MMOL/L (ref 135–145)
SP GR UR STRIP: >=1.03 (ref 1–1.03)
TRIGL SERPL-MCNC: 389 MG/DL (ref 20–135)
URN COLLECT METHOD CLASS: (no result)
UROBILINOGEN UR STRIP-MCNC: 0.2 E.U/DL (ref 0.2–1)
WBC # BLD AUTO: 7.4 X10'3 (ref 4.5–11)
WBC #/AREA URNS HPF: (no result) /HPF (ref 0–4)

## 2023-04-05 PROCEDURE — 80061 LIPID PANEL: CPT

## 2023-04-05 PROCEDURE — 80053 COMPREHEN METABOLIC PANEL: CPT

## 2023-04-05 PROCEDURE — 84443 ASSAY THYROID STIM HORMONE: CPT

## 2023-04-05 PROCEDURE — 84439 ASSAY OF FREE THYROXINE: CPT

## 2023-04-05 PROCEDURE — 85025 COMPLETE CBC W/AUTO DIFF WBC: CPT

## 2023-04-05 PROCEDURE — 81001 URINALYSIS AUTO W/SCOPE: CPT

## 2023-04-05 PROCEDURE — 36415 COLL VENOUS BLD VENIPUNCTURE: CPT

## 2023-04-05 PROCEDURE — 85610 PROTHROMBIN TIME: CPT

## 2023-09-19 ENCOUNTER — HOSPITAL ENCOUNTER (OUTPATIENT)
Dept: HOSPITAL 94 - RAD | Age: 63
Discharge: HOME | End: 2023-09-19
Attending: FAMILY MEDICINE
Payer: COMMERCIAL

## 2023-09-19 DIAGNOSIS — M76.62: ICD-10-CM

## 2023-09-19 DIAGNOSIS — M65.872: Primary | ICD-10-CM

## 2023-09-19 DIAGNOSIS — M25.572: ICD-10-CM

## 2023-09-19 DIAGNOSIS — M72.2: ICD-10-CM

## 2023-09-19 DIAGNOSIS — R60.0: ICD-10-CM

## 2023-09-19 PROCEDURE — 73718 MRI LOWER EXTREMITY W/O DYE: CPT

## 2023-09-19 PROCEDURE — 73721 MRI JNT OF LWR EXTRE W/O DYE: CPT

## 2023-12-08 LAB
ALBUMIN SERPL BCP-MCNC: 4.1 G/DL (ref 3.4–5)
ALBUMIN/GLOB SERPL: 1.1 {RATIO} (ref 1.1–1.5)
ALP SERPL-CCNC: 117 IU/L (ref 46–116)
ALT SERPL W P-5'-P-CCNC: 39 U/L (ref 30–65)
ANION GAP SERPL CALCULATED.3IONS-SCNC: 7 MMOL/L (ref 8–16)
AST SERPL W P-5'-P-CCNC: 33 U/L (ref 10–37)
BASOPHILS # BLD AUTO: 0 X10'3 (ref 0–0.2)
BASOPHILS NFR BLD AUTO: 0.8 % (ref 0–1)
BILIRUB SERPL-MCNC: 0.5 MG/DL (ref 0–1)
BUN SERPL-MCNC: 19 MG/DL (ref 7–18)
BUN/CREAT SERPL: 12.4 (ref 10–20)
CALCIUM SERPL-MCNC: 9.2 MG/DL (ref 8.5–10.1)
CHLORIDE SERPL-SCNC: 103 MMOL/L (ref 99–107)
CO2 SERPL-SCNC: 30.1 MMOL/L (ref 24–32)
CREAT CL PREDICTED SERPL C-G-VRATE: (no result) ML/MIN
CREAT SERPL-MCNC: 1.53 MG/DL (ref 0.6–1.1)
EOSINOPHIL # BLD AUTO: 0.3 X10'3 (ref 0–0.9)
EOSINOPHIL NFR BLD AUTO: 4.6 % (ref 0–6)
ERYTHROCYTE [DISTWIDTH] IN BLOOD BY AUTOMATED COUNT: 14 % (ref 11.5–14.5)
GFR SERPL CREATININE-BSD FRML MDRD: 46 ML/MIN
GLOBULIN SER CALC-MCNC: 3.6 G/DL (ref 2.7–4.3)
GLUCOSE SERPL-MCNC: 87 MG/DL (ref 70–104)
HCT VFR BLD AUTO: 49.2 % (ref 42–52)
HGB BLD-MCNC: 16.5 G/DL (ref 14–17.9)
LYMPHOCYTES # BLD AUTO: 1 X10'3 (ref 1.1–4.8)
LYMPHOCYTES NFR BLD AUTO: 16.1 % (ref 21–51)
MCH RBC QN AUTO: 28.9 PG (ref 27–31)
MCHC RBC AUTO-ENTMCNC: 33.6 G/DL (ref 33–36.5)
MCV RBC AUTO: 86 FL (ref 78–98)
MONOCYTES # BLD AUTO: 0.7 X10'3 (ref 0–0.9)
MONOCYTES NFR BLD AUTO: 11 % (ref 2–12)
NEUTROPHILS # BLD AUTO: 4.1 X10'3 (ref 1.8–7.7)
NEUTROPHILS NFR BLD AUTO: 67.5 % (ref 42–75)
PLATELET # BLD AUTO: 238 X10'3 (ref 140–440)
PMV BLD AUTO: 7.6 FL (ref 7.4–10.4)
POTASSIUM SERPL-SCNC: 3.8 MMOL/L (ref 3.4–5.1)
PROT SERPL-MCNC: 7.7 G/DL (ref 6.4–8.2)
RBC # BLD AUTO: 5.72 X10'6 (ref 4.7–6.1)
SODIUM SERPL-SCNC: 140 MMOL/L (ref 135–145)
WBC # BLD AUTO: 6.1 10'3 (ref 4.8–10.8)

## 2023-12-14 ENCOUNTER — HOSPITAL ENCOUNTER (INPATIENT)
Dept: HOSPITAL 94 - PAS | Age: 63
LOS: 1 days | Discharge: HOME | DRG: 470 | End: 2023-12-15
Attending: ORTHOPAEDIC SURGERY | Admitting: ORTHOPAEDIC SURGERY
Payer: COMMERCIAL

## 2023-12-14 VITALS
SYSTOLIC BLOOD PRESSURE: 102 MMHG | HEART RATE: 64 BPM | OXYGEN SATURATION: 97 % | DIASTOLIC BLOOD PRESSURE: 69 MMHG | RESPIRATION RATE: 14 BRPM

## 2023-12-14 VITALS — SYSTOLIC BLOOD PRESSURE: 145 MMHG | DIASTOLIC BLOOD PRESSURE: 89 MMHG | HEART RATE: 82 BPM

## 2023-12-14 VITALS
DIASTOLIC BLOOD PRESSURE: 79 MMHG | OXYGEN SATURATION: 96 % | RESPIRATION RATE: 16 BRPM | SYSTOLIC BLOOD PRESSURE: 136 MMHG | HEART RATE: 67 BPM

## 2023-12-14 VITALS
SYSTOLIC BLOOD PRESSURE: 167 MMHG | TEMPERATURE: 98.2 F | HEART RATE: 82 BPM | RESPIRATION RATE: 16 BRPM | DIASTOLIC BLOOD PRESSURE: 64 MMHG

## 2023-12-14 VITALS
OXYGEN SATURATION: 93 % | DIASTOLIC BLOOD PRESSURE: 88 MMHG | HEART RATE: 70 BPM | SYSTOLIC BLOOD PRESSURE: 150 MMHG | RESPIRATION RATE: 12 BRPM

## 2023-12-14 VITALS
RESPIRATION RATE: 14 BRPM | OXYGEN SATURATION: 97 % | SYSTOLIC BLOOD PRESSURE: 124 MMHG | HEART RATE: 72 BPM | DIASTOLIC BLOOD PRESSURE: 86 MMHG

## 2023-12-14 VITALS
HEART RATE: 70 BPM | OXYGEN SATURATION: 94 % | SYSTOLIC BLOOD PRESSURE: 100 MMHG | RESPIRATION RATE: 15 BRPM | DIASTOLIC BLOOD PRESSURE: 68 MMHG

## 2023-12-14 VITALS
SYSTOLIC BLOOD PRESSURE: 142 MMHG | TEMPERATURE: 97.4 F | HEART RATE: 67 BPM | OXYGEN SATURATION: 96 % | RESPIRATION RATE: 16 BRPM | DIASTOLIC BLOOD PRESSURE: 104 MMHG

## 2023-12-14 VITALS
OXYGEN SATURATION: 99 % | SYSTOLIC BLOOD PRESSURE: 123 MMHG | DIASTOLIC BLOOD PRESSURE: 91 MMHG | HEART RATE: 61 BPM | RESPIRATION RATE: 12 BRPM

## 2023-12-14 VITALS
SYSTOLIC BLOOD PRESSURE: 146 MMHG | HEART RATE: 75 BPM | TEMPERATURE: 98.1 F | DIASTOLIC BLOOD PRESSURE: 86 MMHG | RESPIRATION RATE: 18 BRPM

## 2023-12-14 VITALS
TEMPERATURE: 98 F | DIASTOLIC BLOOD PRESSURE: 93 MMHG | HEART RATE: 87 BPM | SYSTOLIC BLOOD PRESSURE: 147 MMHG | RESPIRATION RATE: 18 BRPM

## 2023-12-14 VITALS
RESPIRATION RATE: 15 BRPM | OXYGEN SATURATION: 96 % | DIASTOLIC BLOOD PRESSURE: 84 MMHG | HEART RATE: 61 BPM | SYSTOLIC BLOOD PRESSURE: 124 MMHG

## 2023-12-14 VITALS
HEART RATE: 66 BPM | OXYGEN SATURATION: 95 % | RESPIRATION RATE: 19 BRPM | SYSTOLIC BLOOD PRESSURE: 123 MMHG | DIASTOLIC BLOOD PRESSURE: 88 MMHG

## 2023-12-14 VITALS
SYSTOLIC BLOOD PRESSURE: 145 MMHG | DIASTOLIC BLOOD PRESSURE: 68 MMHG | RESPIRATION RATE: 18 BRPM | TEMPERATURE: 97.9 F | HEART RATE: 82 BPM

## 2023-12-14 VITALS
RESPIRATION RATE: 14 BRPM | OXYGEN SATURATION: 96 % | DIASTOLIC BLOOD PRESSURE: 79 MMHG | SYSTOLIC BLOOD PRESSURE: 138 MMHG | HEART RATE: 59 BPM

## 2023-12-14 VITALS
SYSTOLIC BLOOD PRESSURE: 140 MMHG | RESPIRATION RATE: 14 BRPM | OXYGEN SATURATION: 99 % | HEART RATE: 63 BPM | DIASTOLIC BLOOD PRESSURE: 83 MMHG

## 2023-12-14 VITALS
DIASTOLIC BLOOD PRESSURE: 82 MMHG | RESPIRATION RATE: 11 BRPM | SYSTOLIC BLOOD PRESSURE: 126 MMHG | HEART RATE: 57 BPM | OXYGEN SATURATION: 99 %

## 2023-12-14 VITALS
DIASTOLIC BLOOD PRESSURE: 106 MMHG | SYSTOLIC BLOOD PRESSURE: 147 MMHG | HEART RATE: 92 BPM | RESPIRATION RATE: 20 BRPM | TEMPERATURE: 97.3 F | OXYGEN SATURATION: 94 %

## 2023-12-14 VITALS
HEART RATE: 68 BPM | RESPIRATION RATE: 12 BRPM | SYSTOLIC BLOOD PRESSURE: 102 MMHG | OXYGEN SATURATION: 99 % | DIASTOLIC BLOOD PRESSURE: 70 MMHG

## 2023-12-14 VITALS
OXYGEN SATURATION: 100 % | DIASTOLIC BLOOD PRESSURE: 70 MMHG | SYSTOLIC BLOOD PRESSURE: 117 MMHG | RESPIRATION RATE: 13 BRPM | HEART RATE: 61 BPM

## 2023-12-14 VITALS
SYSTOLIC BLOOD PRESSURE: 123 MMHG | DIASTOLIC BLOOD PRESSURE: 99 MMHG | HEART RATE: 61 BPM | RESPIRATION RATE: 16 BRPM | OXYGEN SATURATION: 98 %

## 2023-12-14 VITALS
HEART RATE: 62 BPM | DIASTOLIC BLOOD PRESSURE: 66 MMHG | SYSTOLIC BLOOD PRESSURE: 105 MMHG | OXYGEN SATURATION: 97 % | RESPIRATION RATE: 14 BRPM

## 2023-12-14 VITALS
RESPIRATION RATE: 18 BRPM | TEMPERATURE: 97.9 F | SYSTOLIC BLOOD PRESSURE: 150 MMHG | DIASTOLIC BLOOD PRESSURE: 82 MMHG | HEART RATE: 77 BPM

## 2023-12-14 VITALS
TEMPERATURE: 98 F | RESPIRATION RATE: 18 BRPM | HEART RATE: 77 BPM | SYSTOLIC BLOOD PRESSURE: 154 MMHG | DIASTOLIC BLOOD PRESSURE: 96 MMHG

## 2023-12-14 VITALS
SYSTOLIC BLOOD PRESSURE: 124 MMHG | OXYGEN SATURATION: 100 % | HEART RATE: 62 BPM | DIASTOLIC BLOOD PRESSURE: 77 MMHG | RESPIRATION RATE: 13 BRPM

## 2023-12-14 VITALS
HEART RATE: 78 BPM | DIASTOLIC BLOOD PRESSURE: 90 MMHG | SYSTOLIC BLOOD PRESSURE: 137 MMHG | TEMPERATURE: 98 F | RESPIRATION RATE: 20 BRPM

## 2023-12-14 VITALS
RESPIRATION RATE: 18 BRPM | SYSTOLIC BLOOD PRESSURE: 123 MMHG | HEART RATE: 62 BPM | OXYGEN SATURATION: 97 % | DIASTOLIC BLOOD PRESSURE: 88 MMHG

## 2023-12-14 VITALS
HEART RATE: 59 BPM | RESPIRATION RATE: 13 BRPM | SYSTOLIC BLOOD PRESSURE: 108 MMHG | OXYGEN SATURATION: 96 % | DIASTOLIC BLOOD PRESSURE: 70 MMHG

## 2023-12-14 VITALS — WEIGHT: 244.49 LBS | BODY MASS INDEX: 33.12 KG/M2 | HEIGHT: 72 IN

## 2023-12-14 VITALS
RESPIRATION RATE: 18 BRPM | TEMPERATURE: 98.1 F | DIASTOLIC BLOOD PRESSURE: 98 MMHG | SYSTOLIC BLOOD PRESSURE: 157 MMHG | HEART RATE: 71 BPM

## 2023-12-14 VITALS
DIASTOLIC BLOOD PRESSURE: 85 MMHG | RESPIRATION RATE: 8 BRPM | HEART RATE: 64 BPM | OXYGEN SATURATION: 100 % | SYSTOLIC BLOOD PRESSURE: 137 MMHG

## 2023-12-14 VITALS
DIASTOLIC BLOOD PRESSURE: 88 MMHG | SYSTOLIC BLOOD PRESSURE: 126 MMHG | RESPIRATION RATE: 15 BRPM | OXYGEN SATURATION: 95 % | HEART RATE: 61 BPM

## 2023-12-14 VITALS
TEMPERATURE: 98 F | DIASTOLIC BLOOD PRESSURE: 79 MMHG | OXYGEN SATURATION: 96 % | RESPIRATION RATE: 20 BRPM | HEART RATE: 88 BPM | SYSTOLIC BLOOD PRESSURE: 136 MMHG

## 2023-12-14 VITALS
SYSTOLIC BLOOD PRESSURE: 110 MMHG | RESPIRATION RATE: 15 BRPM | OXYGEN SATURATION: 97 % | HEART RATE: 59 BPM | DIASTOLIC BLOOD PRESSURE: 76 MMHG

## 2023-12-14 VITALS
HEART RATE: 61 BPM | OXYGEN SATURATION: 96 % | RESPIRATION RATE: 14 BRPM | SYSTOLIC BLOOD PRESSURE: 118 MMHG | DIASTOLIC BLOOD PRESSURE: 74 MMHG

## 2023-12-14 VITALS
SYSTOLIC BLOOD PRESSURE: 119 MMHG | RESPIRATION RATE: 15 BRPM | OXYGEN SATURATION: 93 % | HEART RATE: 65 BPM | DIASTOLIC BLOOD PRESSURE: 83 MMHG

## 2023-12-14 VITALS
RESPIRATION RATE: 15 BRPM | HEART RATE: 57 BPM | SYSTOLIC BLOOD PRESSURE: 138 MMHG | OXYGEN SATURATION: 99 % | DIASTOLIC BLOOD PRESSURE: 72 MMHG

## 2023-12-14 VITALS — OXYGEN SATURATION: 94 %

## 2023-12-14 VITALS
HEART RATE: 70 BPM | OXYGEN SATURATION: 94 % | DIASTOLIC BLOOD PRESSURE: 84 MMHG | SYSTOLIC BLOOD PRESSURE: 115 MMHG | RESPIRATION RATE: 16 BRPM

## 2023-12-14 DIAGNOSIS — M16.11: Primary | ICD-10-CM

## 2023-12-14 PROCEDURE — 0SR906Z REPLACEMENT OF RIGHT HIP JOINT WITH OXIDIZED ZIRCONIUM ON POLYETHYLENE SYNTHETIC SUBSTITUTE, OPEN APPROACH: ICD-10-PCS | Performed by: ORTHOPAEDIC SURGERY

## 2023-12-14 PROCEDURE — 86901 BLOOD TYPING SEROLOGIC RH(D): CPT

## 2023-12-14 PROCEDURE — 72170 X-RAY EXAM OF PELVIS: CPT

## 2023-12-14 PROCEDURE — 80053 COMPREHEN METABOLIC PANEL: CPT

## 2023-12-14 PROCEDURE — 97161 PT EVAL LOW COMPLEX 20 MIN: CPT

## 2023-12-14 PROCEDURE — 80051 ELECTROLYTE PANEL: CPT

## 2023-12-14 PROCEDURE — 82948 REAGENT STRIP/BLOOD GLUCOSE: CPT

## 2023-12-14 PROCEDURE — 86885 COOMBS TEST INDIRECT QUAL: CPT

## 2023-12-14 PROCEDURE — 85025 COMPLETE CBC W/AUTO DIFF WBC: CPT

## 2023-12-14 PROCEDURE — 86900 BLOOD TYPING SEROLOGIC ABO: CPT

## 2023-12-14 PROCEDURE — 87081 CULTURE SCREEN ONLY: CPT

## 2023-12-14 PROCEDURE — 36415 COLL VENOUS BLD VENIPUNCTURE: CPT

## 2023-12-14 PROCEDURE — 97116 GAIT TRAINING THERAPY: CPT

## 2023-12-14 PROCEDURE — 97530 THERAPEUTIC ACTIVITIES: CPT

## 2023-12-14 PROCEDURE — 3E0T3BZ INTRODUCTION OF ANESTHETIC AGENT INTO PERIPHERAL NERVES AND PLEXI, PERCUTANEOUS APPROACH: ICD-10-PCS | Performed by: ORTHOPAEDIC SURGERY

## 2023-12-14 RX ADMIN — CEFAZOLIN SODIUM SCH MLS/HR: 2 INJECTION, SOLUTION INTRAVENOUS at 16:26

## 2023-12-14 RX ADMIN — SODIUM CHLORIDE AND POTASSIUM CHLORIDE SCH MLS/HR: 4.5; 1.49 INJECTION, SOLUTION INTRAVENOUS at 10:43

## 2023-12-14 RX ADMIN — CEFAZOLIN SODIUM SCH MLS/HR: 2 INJECTION, SOLUTION INTRAVENOUS at 23:53

## 2023-12-14 RX ADMIN — SODIUM CHLORIDE AND POTASSIUM CHLORIDE SCH MLS/HR: 4.5; 1.49 INJECTION, SOLUTION INTRAVENOUS at 14:45

## 2023-12-14 RX ADMIN — HYDROCODONE BITARTRATE AND ACETAMINOPHEN PRN TAB: 10; 325 TABLET ORAL at 22:48

## 2023-12-14 RX ADMIN — SODIUM CHLORIDE AND POTASSIUM CHLORIDE SCH MLS/HR: 4.5; 1.49 INJECTION, SOLUTION INTRAVENOUS at 20:24

## 2023-12-14 RX ADMIN — SODIUM CHLORIDE, SODIUM LACTATE, POTASSIUM CHLORIDE, AND CALCIUM CHLORIDE SCH MLS/HR: .6; .31; .03; .02 INJECTION, SOLUTION INTRAVENOUS at 15:05

## 2023-12-14 RX ADMIN — SODIUM CHLORIDE, SODIUM LACTATE, POTASSIUM CHLORIDE, AND CALCIUM CHLORIDE SCH MLS/HR: .6; .31; .03; .02 INJECTION, SOLUTION INTRAVENOUS at 06:20

## 2023-12-14 RX ADMIN — HYDROCODONE BITARTRATE AND ACETAMINOPHEN PRN TAB: 10; 325 TABLET ORAL at 15:02

## 2023-12-15 VITALS
TEMPERATURE: 97.5 F | HEART RATE: 71 BPM | DIASTOLIC BLOOD PRESSURE: 92 MMHG | OXYGEN SATURATION: 97 % | SYSTOLIC BLOOD PRESSURE: 149 MMHG | RESPIRATION RATE: 18 BRPM

## 2023-12-15 VITALS — RESPIRATION RATE: 18 BRPM | OXYGEN SATURATION: 97 %

## 2023-12-15 VITALS — SYSTOLIC BLOOD PRESSURE: 149 MMHG | HEART RATE: 71 BPM

## 2023-12-15 VITALS
DIASTOLIC BLOOD PRESSURE: 74 MMHG | TEMPERATURE: 97.6 F | OXYGEN SATURATION: 96 % | HEART RATE: 47 BPM | RESPIRATION RATE: 22 BRPM | SYSTOLIC BLOOD PRESSURE: 125 MMHG

## 2023-12-15 LAB
ANION GAP SERPL CALCULATED.3IONS-SCNC: 9 MMOL/L (ref 8–16)
BASOPHILS # BLD AUTO: 0 X10'3 (ref 0–0.2)
BASOPHILS NFR BLD AUTO: 0.4 % (ref 0–1)
CHLORIDE SERPL-SCNC: 104 MMOL/L (ref 99–107)
CO2 SERPL-SCNC: 25 MMOL/L (ref 24–32)
EOSINOPHIL # BLD AUTO: 0.1 X10'3 (ref 0–0.9)
EOSINOPHIL NFR BLD AUTO: 0.7 % (ref 0–6)
ERYTHROCYTE [DISTWIDTH] IN BLOOD BY AUTOMATED COUNT: 14.2 % (ref 11.5–14.5)
HCT VFR BLD AUTO: 40.9 % (ref 42–52)
HGB BLD-MCNC: 13.6 G/DL (ref 14–17.9)
LYMPHOCYTES # BLD AUTO: 0.9 X10'3 (ref 1.1–4.8)
LYMPHOCYTES NFR BLD AUTO: 8.7 % (ref 21–51)
MCH RBC QN AUTO: 28.9 PG (ref 27–31)
MCHC RBC AUTO-ENTMCNC: 33.4 G/DL (ref 33–36.5)
MCV RBC AUTO: 86.5 FL (ref 78–98)
MONOCYTES # BLD AUTO: 0.9 X10'3 (ref 0–0.9)
MONOCYTES NFR BLD AUTO: 9.5 % (ref 2–12)
NEUTROPHILS # BLD AUTO: 8 X10'3 (ref 1.8–7.7)
NEUTROPHILS NFR BLD AUTO: 80.7 % (ref 42–75)
PLATELET # BLD AUTO: 181 X10'3 (ref 140–440)
PMV BLD AUTO: 7.8 FL (ref 7.4–10.4)
POTASSIUM SERPL-SCNC: 4 MMOL/L (ref 3.5–5.1)
RBC # BLD AUTO: 4.72 X10'6 (ref 4.7–6.1)
SODIUM SERPL-SCNC: 138 MMOL/L (ref 135–145)
WBC # BLD AUTO: 9.9 X10'3 (ref 4.5–11)

## 2023-12-15 RX ADMIN — HYDROCODONE BITARTRATE AND ACETAMINOPHEN PRN TAB: 10; 325 TABLET ORAL at 05:46

## 2023-12-15 RX ADMIN — SODIUM CHLORIDE AND POTASSIUM CHLORIDE SCH MLS/HR: 4.5; 1.49 INJECTION, SOLUTION INTRAVENOUS at 04:30

## 2024-04-03 ENCOUNTER — HOSPITAL ENCOUNTER (EMERGENCY)
Dept: HOSPITAL 94 - ER | Age: 64
Discharge: HOME | End: 2024-04-03
Payer: COMMERCIAL

## 2024-04-03 VITALS
RESPIRATION RATE: 18 BRPM | DIASTOLIC BLOOD PRESSURE: 81 MMHG | OXYGEN SATURATION: 98 % | SYSTOLIC BLOOD PRESSURE: 145 MMHG | HEART RATE: 77 BPM | TEMPERATURE: 97.8 F

## 2024-04-03 VITALS — HEIGHT: 72 IN | WEIGHT: 240.5 LBS | BODY MASS INDEX: 32.57 KG/M2
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
## 2024-04-03 DIAGNOSIS — S59.901A: Primary | ICD-10-CM
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
## 2024-04-03 PROCEDURE — 99283 EMERGENCY DEPT VISIT LOW MDM: CPT

## 2024-04-03 PROCEDURE — 73080 X-RAY EXAM OF ELBOW: CPT

## 2024-05-17 ENCOUNTER — HOSPITAL ENCOUNTER (OUTPATIENT)
Dept: HOSPITAL 94 - MRI | Age: 64
Discharge: HOME | End: 2024-05-17
Attending: FAMILY MEDICINE
Payer: COMMERCIAL

## 2024-05-17 DIAGNOSIS — M67.472: ICD-10-CM

## 2024-05-17 DIAGNOSIS — M25.472: ICD-10-CM

## 2024-05-17 DIAGNOSIS — M25.572: Primary | ICD-10-CM

## 2024-05-17 DIAGNOSIS — M65.9: ICD-10-CM

## 2024-05-17 DIAGNOSIS — M77.32: ICD-10-CM

## 2024-05-17 PROCEDURE — 73721 MRI JNT OF LWR EXTRE W/O DYE: CPT

## 2024-12-17 ENCOUNTER — HOSPITAL ENCOUNTER (OUTPATIENT)
Dept: HOSPITAL 94 - MRI | Age: 64
Discharge: HOME | End: 2024-12-17
Payer: COMMERCIAL

## 2024-12-17 DIAGNOSIS — M25.571: ICD-10-CM

## 2024-12-17 DIAGNOSIS — E78.1: ICD-10-CM

## 2024-12-17 DIAGNOSIS — R60.0: ICD-10-CM

## 2024-12-17 DIAGNOSIS — M25.572: ICD-10-CM
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
## 2024-12-17 DIAGNOSIS — N40.1: ICD-10-CM

## 2024-12-17 DIAGNOSIS — M84.475A: ICD-10-CM

## 2024-12-17 DIAGNOSIS — I10: ICD-10-CM

## 2024-12-17 DIAGNOSIS — S93.691A: Primary | ICD-10-CM
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
## 2024-12-17 DIAGNOSIS — M77.32: ICD-10-CM

## 2024-12-17 DIAGNOSIS — E29.9: ICD-10-CM

## 2024-12-17 LAB
ALBUMIN SERPL BCP-MCNC: 3.5 G/DL (ref 3.4–5)
ALBUMIN/GLOB SERPL: 1 {RATIO} (ref 1.1–1.5)
ALP SERPL-CCNC: 97 IU/L (ref 46–116)
ALT SERPL W P-5'-P-CCNC: 46 U/L (ref 12–78)
ANION GAP SERPL CALCULATED.3IONS-SCNC: 9 MMOL/L (ref 8–16)
AST SERPL W P-5'-P-CCNC: 30 U/L (ref 10–37)
BASOPHILS # BLD AUTO: 0 X10'3 (ref 0–0.2)
BASOPHILS NFR BLD AUTO: 0.7 % (ref 0–1)
BILIRUB SERPL-MCNC: 0.4 MG/DL (ref 0.1–1)
BUN SERPL-MCNC: 22 MG/DL (ref 7–18)
BUN/CREAT SERPL: 13.8 (ref 10–20)
CALCIUM SERPL-MCNC: 8.7 MG/DL (ref 8.5–10.1)
CHLORIDE SERPL-SCNC: 108 MMOL/L (ref 99–107)
CHOLEST SERPL-MCNC: 148 MG/DL (ref 0–200)
CHOLEST/HDLC SERPL: 3.7 {RATIO} (ref 0–4.99)
CO2 SERPL-SCNC: 27.8 MMOL/L (ref 24–32)
CREAT CL PREDICTED SERPL C-G-VRATE: (no result) ML/MIN
CREAT SERPL-MCNC: 1.6 MG/DL (ref 0.6–1.1)
EOSINOPHIL # BLD AUTO: 0.4 X10'3 (ref 0–0.9)
EOSINOPHIL NFR BLD AUTO: 5.4 % (ref 0–6)
ERYTHROCYTE [DISTWIDTH] IN BLOOD BY AUTOMATED COUNT: 14.9 % (ref 11.5–14.5)
GFR SERPL CREATININE-BSD FRML MDRD: 44 ML/MIN
GLOBULIN SER CALC-MCNC: 3.6 G/DL (ref 2.7–4.3)
GLUCOSE SERPL-MCNC: 85 MG/DL (ref 70–104)
HCT VFR BLD AUTO: 43.9 % (ref 42–52)
HDLC SERPL-MCNC: 40 MG/DL (ref 35–60)
HGB BLD-MCNC: 14.9 G/DL (ref 14–17.9)
LDLC SERPL DIRECT ASSAY-MCNC: 81 MG/DL (ref 50–100)
LYMPHOCYTES # BLD AUTO: 1.1 X10'3 (ref 1.1–4.8)
LYMPHOCYTES NFR BLD AUTO: 16.6 % (ref 21–51)
MCH RBC QN AUTO: 29.5 PG (ref 27–31)
MCHC RBC AUTO-ENTMCNC: 34 G/DL (ref 33–36.5)
MCV RBC AUTO: 86.9 FL (ref 78–98)
MONOCYTES # BLD AUTO: 0.7 X10'3 (ref 0–0.9)
MONOCYTES NFR BLD AUTO: 10.9 % (ref 2–12)
NEUTROPHILS # BLD AUTO: 4.4 X10'3 (ref 1.8–7.7)
NEUTROPHILS NFR BLD AUTO: 66.4 % (ref 42–75)
PLATELET # BLD AUTO: 237 X10'3 (ref 140–440)
PMV BLD AUTO: 7.5 FL (ref 7.4–10.4)
POTASSIUM SERPL-SCNC: 3.8 MMOL/L (ref 3.5–5.1)
PROT SERPL-MCNC: 7.1 G/DL (ref 6.4–8.2)
RBC # BLD AUTO: 5.05 X10'6 (ref 4.7–6.1)
SODIUM SERPL-SCNC: 145 MMOL/L (ref 135–145)
T4 FREE SERPL-MCNC: 0.85 NG/DL (ref 0.73–1.4)
TRIGL SERPL-MCNC: 146 MG/DL (ref 20–135)
TSH SERPL DL<=0.05 MIU/L-ACNC: 2.94 ULU/ML (ref 0.34–4.5)
WBC # BLD AUTO: 6.6 X10'3 (ref 4.5–11)

## 2024-12-17 PROCEDURE — 84403 ASSAY OF TOTAL TESTOSTERONE: CPT

## 2024-12-17 PROCEDURE — 85025 COMPLETE CBC W/AUTO DIFF WBC: CPT

## 2024-12-17 PROCEDURE — 84439 ASSAY OF FREE THYROXINE: CPT

## 2024-12-17 PROCEDURE — 80053 COMPREHEN METABOLIC PANEL: CPT

## 2024-12-17 PROCEDURE — 36415 COLL VENOUS BLD VENIPUNCTURE: CPT

## 2024-12-17 PROCEDURE — 80061 LIPID PANEL: CPT

## 2024-12-17 PROCEDURE — 73721 MRI JNT OF LWR EXTRE W/O DYE: CPT

## 2024-12-17 PROCEDURE — 73718 MRI LOWER EXTREMITY W/O DYE: CPT

## 2024-12-17 PROCEDURE — 84402 ASSAY OF FREE TESTOSTERONE: CPT

## 2024-12-17 PROCEDURE — 84443 ASSAY THYROID STIM HORMONE: CPT

## 2024-12-19 LAB — TESTOST SERPL-MCNC: 985 NG/DL (ref 264–916)

## 2025-06-09 ENCOUNTER — HOSPITAL ENCOUNTER (OUTPATIENT)
Dept: HOSPITAL 94 - CARD DIAG | Age: 65
Discharge: HOME | End: 2025-06-09
Attending: NURSE PRACTITIONER
Payer: COMMERCIAL

## 2025-06-09 DIAGNOSIS — I08.8: Primary | ICD-10-CM

## 2025-06-09 DIAGNOSIS — I11.9: ICD-10-CM

## 2025-06-09 PROCEDURE — 93306 TTE W/DOPPLER COMPLETE: CPT

## 2025-06-18 ENCOUNTER — HOSPITAL ENCOUNTER (OUTPATIENT)
Dept: HOSPITAL 94 - LAB | Age: 65
Discharge: HOME | End: 2025-06-18
Attending: INTERNAL MEDICINE
Payer: COMMERCIAL

## 2025-06-18 DIAGNOSIS — Z01.812: Primary | ICD-10-CM

## 2025-06-18 LAB
ALBUMIN SERPL BCP-MCNC: 3.9 G/DL (ref 3.4–5)
ALBUMIN/GLOB SERPL: 1.1 {RATIO} (ref 1.1–1.5)
ALP SERPL-CCNC: 113 IU/L (ref 46–116)
ALT SERPL W P-5'-P-CCNC: 47 U/L (ref 12–78)
ANION GAP SERPL CALCULATED.3IONS-SCNC: 9 MMOL/L (ref 8–16)
AST SERPL W P-5'-P-CCNC: 23 U/L (ref 10–37)
BASOPHILS # BLD AUTO: 0.1 X10'3 (ref 0–0.2)
BASOPHILS NFR BLD AUTO: 0.9 % (ref 0–1)
BILIRUB SERPL-MCNC: 0.5 MG/DL (ref 0.1–1)
BSA: (no result) M2
BUN SERPL-MCNC: 23 MG/DL (ref 7–18)
BUN/CREAT SERPL: 16 (ref 10–20)
CALCIUM SERPL-MCNC: 8.5 MG/DL (ref 8.5–10.1)
CHLORIDE SERPL-SCNC: 105 MMOL/L (ref 99–107)
CO2 SERPL-SCNC: 26.5 MMOL/L (ref 24–32)
CREAT CL PREDICTED SERPL C-G-VRATE: (no result) ML/MIN
CREAT SERPL-MCNC: 1.44 MG/DL (ref 0.6–1.1)
EOSINOPHIL # BLD AUTO: 0.3 X10'3 (ref 0–0.9)
EOSINOPHIL NFR BLD AUTO: 5.6 % (ref 0–6)
ERYTHROCYTE [DISTWIDTH] IN BLOOD BY AUTOMATED COUNT: 14.6 % (ref 11.5–14.5)
GFR SERPL CREATININE-BSD FRML MDRD: 49 ML/MIN
GLOBULIN SER CALC-MCNC: 3.4 G/DL (ref 2.7–4.3)
GLUCOSE SERPL-MCNC: 110 MG/DL (ref 70–104)
HCT VFR BLD AUTO: 47.4 % (ref 42–52)
HGB BLD-MCNC: 15.7 G/DL (ref 14–17.9)
LYMPHOCYTES # BLD AUTO: 1.1 X10'3 (ref 1.1–4.8)
LYMPHOCYTES NFR BLD AUTO: 18.1 % (ref 21–51)
MCH RBC QN AUTO: 28.4 PG (ref 27–31)
MCHC RBC AUTO-ENTMCNC: 33.1 G/DL (ref 33–36.5)
MCV RBC AUTO: 85.8 FL (ref 78–98)
MONOCYTES # BLD AUTO: 0.6 X10'3 (ref 0–0.9)
MONOCYTES NFR BLD AUTO: 9.9 % (ref 2–12)
NEUTROPHILS # BLD AUTO: 4.1 X10'3 (ref 1.8–7.7)
NEUTROPHILS NFR BLD AUTO: 65.5 % (ref 42–75)
PLATELET # BLD AUTO: 251 X10'3 (ref 140–440)
PMV BLD AUTO: 7.8 FL (ref 7.4–10.4)
POTASSIUM SERPL-SCNC: 4.6 MMOL/L (ref 3.5–5.1)
PROT SERPL-MCNC: 7.3 G/DL (ref 6.4–8.2)
RBC # BLD AUTO: 5.52 X10'6 (ref 4.7–6.1)
SODIUM SERPL-SCNC: 140 MMOL/L (ref 135–145)
WBC # BLD AUTO: 6.2 X10'3 (ref 4.5–11)

## 2025-06-18 PROCEDURE — 36415 COLL VENOUS BLD VENIPUNCTURE: CPT

## 2025-06-18 PROCEDURE — 85025 COMPLETE CBC W/AUTO DIFF WBC: CPT

## 2025-06-18 PROCEDURE — 80053 COMPREHEN METABOLIC PANEL: CPT

## 2025-07-21 LAB
ALBUMIN SERPL BCP-MCNC: 4 G/DL (ref 3.4–5)
ALBUMIN/GLOB SERPL: 1.2 {RATIO} (ref 1.1–1.5)
ALP SERPL-CCNC: 122 IU/L (ref 46–116)
ALT SERPL W P-5'-P-CCNC: 49 U/L (ref 30–65)
ANION GAP SERPL CALCULATED.3IONS-SCNC: 10 MMOL/L (ref 8–16)
AST SERPL W P-5'-P-CCNC: 24 U/L (ref 10–37)
BASOPHILS # BLD AUTO: 0.1 X10'3 (ref 0–0.2)
BASOPHILS NFR BLD AUTO: 1 % (ref 0–1)
BILIRUB SERPL-MCNC: 0.5 MG/DL (ref 0–1)
BILIRUB UR QL STRIP: NEGATIVE
BSA: (no result) M2
BUN SERPL-MCNC: 31 MG/DL (ref 7–18)
BUN/CREAT SERPL: 17.6 (ref 10–20)
CALCIUM SERPL-MCNC: 9.1 MG/DL (ref 8.5–10.1)
CHLORIDE SERPL-SCNC: 103 MMOL/L (ref 99–107)
CLARITY UR: CLEAR
CO2 SERPL-SCNC: 28.6 MMOL/L (ref 24–32)
COLOR UR: YELLOW
CREAT CL PREDICTED SERPL C-G-VRATE: (no result) ML/MIN
CREAT SERPL-MCNC: 1.76 MG/DL (ref 0.6–1.1)
EOSINOPHIL # BLD AUTO: 0.3 X10'3 (ref 0–0.9)
EOSINOPHIL NFR BLD AUTO: 4.9 % (ref 0–6)
ERYTHROCYTE [DISTWIDTH] IN BLOOD BY AUTOMATED COUNT: 14.3 % (ref 11.5–14.5)
GFR SERPL CREATININE-BSD FRML MDRD: 39 ML/MIN
GLOBULIN SER CALC-MCNC: 3.4 G/DL (ref 2.7–4.3)
GLUCOSE SERPL-MCNC: 135 MG/DL (ref 70–104)
GLUCOSE UR STRIP-MCNC: NEGATIVE MG/DL
HCT VFR BLD AUTO: 49.3 % (ref 42–52)
HGB BLD-MCNC: 16.3 G/DL (ref 14–17.9)
HGB UR QL STRIP: NEGATIVE
KETONES UR STRIP-MCNC: NEGATIVE MG/DL
LEUKOCYTE ESTERASE UR QL STRIP: NEGATIVE
LYMPHOCYTES # BLD AUTO: 1.1 X10'3 (ref 1.1–4.8)
LYMPHOCYTES NFR BLD AUTO: 16.9 % (ref 21–51)
MCH RBC QN AUTO: 28.4 PG (ref 27–31)
MCHC RBC AUTO-ENTMCNC: 33 G/DL (ref 33–36.5)
MCV RBC AUTO: 85.8 FL (ref 78–98)
MONOCYTES # BLD AUTO: 0.7 X10'3 (ref 0–0.9)
MONOCYTES NFR BLD AUTO: 10 % (ref 2–12)
NEUTROPHILS # BLD AUTO: 4.5 X10'3 (ref 1.8–7.7)
NEUTROPHILS NFR BLD AUTO: 67.2 % (ref 42–75)
NITRITE UR QL STRIP: NEGATIVE
PH UR STRIP: 6 [PH] (ref 4.8–8)
PLATELET # BLD AUTO: 259 X10'3 (ref 140–440)
PMV BLD AUTO: 7.5 FL (ref 7.4–10.4)
POTASSIUM SERPL-SCNC: 3.9 MMOL/L (ref 3.4–5.1)
PROT SERPL-MCNC: 7.4 G/DL (ref 6.4–8.2)
PROT UR QL STRIP: NEGATIVE MG/DL
RBC # BLD AUTO: 5.75 X10'6 (ref 4.7–6.1)
SODIUM SERPL-SCNC: 142 MMOL/L (ref 135–145)
SP GR UR STRIP: >=1.03 (ref 1–1.03)
URN COLLECT METHOD CLASS: (no result)
UROBILINOGEN UR STRIP-MCNC: 0.2 E.U/DL (ref 0.2–1)
WBC # BLD AUTO: 6.7 10'3 (ref 4.8–10.8)

## 2025-07-25 ENCOUNTER — HOSPITAL ENCOUNTER (OUTPATIENT)
Dept: HOSPITAL 94 - PAS | Age: 65
Discharge: HOME | End: 2025-07-25
Attending: STUDENT IN AN ORGANIZED HEALTH CARE EDUCATION/TRAINING PROGRAM
Payer: COMMERCIAL

## 2025-07-25 VITALS
SYSTOLIC BLOOD PRESSURE: 132 MMHG | RESPIRATION RATE: 16 BRPM | HEART RATE: 84 BPM | OXYGEN SATURATION: 96 % | DIASTOLIC BLOOD PRESSURE: 83 MMHG

## 2025-07-25 VITALS
RESPIRATION RATE: 14 BRPM | HEART RATE: 77 BPM | SYSTOLIC BLOOD PRESSURE: 138 MMHG | DIASTOLIC BLOOD PRESSURE: 85 MMHG | OXYGEN SATURATION: 93 %

## 2025-07-25 VITALS — HEIGHT: 72 IN | BODY MASS INDEX: 34.34 KG/M2 | WEIGHT: 253.53 LBS

## 2025-07-25 VITALS
DIASTOLIC BLOOD PRESSURE: 85 MMHG | RESPIRATION RATE: 14 BRPM | HEART RATE: 83 BPM | OXYGEN SATURATION: 94 % | SYSTOLIC BLOOD PRESSURE: 138 MMHG

## 2025-07-25 VITALS
OXYGEN SATURATION: 93 % | SYSTOLIC BLOOD PRESSURE: 130 MMHG | DIASTOLIC BLOOD PRESSURE: 84 MMHG | RESPIRATION RATE: 11 BRPM | HEART RATE: 76 BPM

## 2025-07-25 VITALS
DIASTOLIC BLOOD PRESSURE: 75 MMHG | HEART RATE: 87 BPM | OXYGEN SATURATION: 95 % | RESPIRATION RATE: 12 BRPM | SYSTOLIC BLOOD PRESSURE: 130 MMHG

## 2025-07-25 VITALS
DIASTOLIC BLOOD PRESSURE: 91 MMHG | OXYGEN SATURATION: 93 % | HEART RATE: 81 BPM | RESPIRATION RATE: 12 BRPM | SYSTOLIC BLOOD PRESSURE: 145 MMHG

## 2025-07-25 VITALS
HEART RATE: 76 BPM | DIASTOLIC BLOOD PRESSURE: 88 MMHG | RESPIRATION RATE: 14 BRPM | OXYGEN SATURATION: 95 % | SYSTOLIC BLOOD PRESSURE: 139 MMHG

## 2025-07-25 VITALS
TEMPERATURE: 98.5 F | OXYGEN SATURATION: 97 % | SYSTOLIC BLOOD PRESSURE: 139 MMHG | HEART RATE: 75 BPM | DIASTOLIC BLOOD PRESSURE: 106 MMHG | RESPIRATION RATE: 16 BRPM

## 2025-07-25 VITALS
SYSTOLIC BLOOD PRESSURE: 137 MMHG | HEART RATE: 78 BPM | DIASTOLIC BLOOD PRESSURE: 80 MMHG | RESPIRATION RATE: 15 BRPM | OXYGEN SATURATION: 93 %

## 2025-07-25 DIAGNOSIS — Z79.899: ICD-10-CM
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
## 2025-07-25 DIAGNOSIS — Z79.891: ICD-10-CM

## 2025-07-25 DIAGNOSIS — M25.472: ICD-10-CM

## 2025-07-25 DIAGNOSIS — Z98.890: ICD-10-CM

## 2025-07-25 DIAGNOSIS — M76.72: ICD-10-CM

## 2025-07-25 DIAGNOSIS — M25.372: ICD-10-CM

## 2025-07-25 DIAGNOSIS — M25.572: ICD-10-CM

## 2025-07-25 DIAGNOSIS — S93.402A: Primary | ICD-10-CM

## 2025-07-25 DIAGNOSIS — Z79.82: ICD-10-CM
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
## 2025-07-25 PROCEDURE — 27698 REPAIR OF ANKLE LIGAMENT: CPT

## 2025-07-25 PROCEDURE — 27680 RELEASE OF LOWER LEG TENDON: CPT

## 2025-07-25 PROCEDURE — 85025 COMPLETE CBC W/AUTO DIFF WBC: CPT

## 2025-07-25 PROCEDURE — 80053 COMPREHEN METABOLIC PANEL: CPT

## 2025-07-25 PROCEDURE — 81003 URINALYSIS AUTO W/O SCOPE: CPT

## 2025-07-25 PROCEDURE — 82948 REAGENT STRIP/BLOOD GLUCOSE: CPT

## 2025-07-25 PROCEDURE — 27659 REPAIR OF LEG TENDON EACH: CPT

## 2025-07-25 PROCEDURE — 36415 COLL VENOUS BLD VENIPUNCTURE: CPT

## 2025-07-25 PROCEDURE — 29895 ANKLE ARTHROSCOPY/SURGERY: CPT

## 2025-07-25 RX ADMIN — CEFAZOLIN SODIUM ONE MLS/HR: 2 SOLUTION INTRAVENOUS at 06:00

## 2025-07-25 RX ADMIN — FAMOTIDINE ONE MG: 20 TABLET ORAL at 06:13

## 2025-07-25 RX ADMIN — SODIUM CHLORIDE, SODIUM LACTATE, POTASSIUM CHLORIDE, AND CALCIUM CHLORIDE SCH MLS/HR: .6; .31; .03; .02 INJECTION, SOLUTION INTRAVENOUS at 06:13

## 2025-07-25 RX ADMIN — ONDANSETRON PRN MG: 2 INJECTION, SOLUTION INTRAMUSCULAR; INTRAVENOUS at 10:44
